# Patient Record
Sex: MALE | Race: OTHER | HISPANIC OR LATINO | URBAN - METROPOLITAN AREA
[De-identification: names, ages, dates, MRNs, and addresses within clinical notes are randomized per-mention and may not be internally consistent; named-entity substitution may affect disease eponyms.]

---

## 2018-04-27 ENCOUNTER — EMERGENCY (EMERGENCY)
Facility: HOSPITAL | Age: 83
LOS: 0 days | Discharge: HOME | End: 2018-04-27
Attending: EMERGENCY MEDICINE | Admitting: EMERGENCY MEDICINE

## 2018-04-27 VITALS
SYSTOLIC BLOOD PRESSURE: 183 MMHG | TEMPERATURE: 97 F | HEART RATE: 74 BPM | RESPIRATION RATE: 18 BRPM | DIASTOLIC BLOOD PRESSURE: 93 MMHG | OXYGEN SATURATION: 99 %

## 2018-04-27 VITALS
RESPIRATION RATE: 18 BRPM | TEMPERATURE: 96 F | OXYGEN SATURATION: 99 % | HEART RATE: 73 BPM | DIASTOLIC BLOOD PRESSURE: 99 MMHG | SYSTOLIC BLOOD PRESSURE: 197 MMHG

## 2018-04-27 DIAGNOSIS — R10.11 RIGHT UPPER QUADRANT PAIN: ICD-10-CM

## 2018-04-27 DIAGNOSIS — R10.13 EPIGASTRIC PAIN: ICD-10-CM

## 2018-04-27 DIAGNOSIS — R10.9 UNSPECIFIED ABDOMINAL PAIN: ICD-10-CM

## 2018-04-27 DIAGNOSIS — Z95.810 PRESENCE OF AUTOMATIC (IMPLANTABLE) CARDIAC DEFIBRILLATOR: Chronic | ICD-10-CM

## 2018-04-27 DIAGNOSIS — Z95.810 PRESENCE OF AUTOMATIC (IMPLANTABLE) CARDIAC DEFIBRILLATOR: ICD-10-CM

## 2018-04-27 DIAGNOSIS — M54.5 LOW BACK PAIN: ICD-10-CM

## 2018-04-27 DIAGNOSIS — J45.909 UNSPECIFIED ASTHMA, UNCOMPLICATED: ICD-10-CM

## 2018-04-27 LAB
ALBUMIN SERPL ELPH-MCNC: 3.9 G/DL — SIGNIFICANT CHANGE UP (ref 3.5–5.2)
ALP SERPL-CCNC: 97 U/L — SIGNIFICANT CHANGE UP (ref 30–115)
ALT FLD-CCNC: 15 U/L — SIGNIFICANT CHANGE UP (ref 0–41)
ANION GAP SERPL CALC-SCNC: 9 MMOL/L — SIGNIFICANT CHANGE UP (ref 7–14)
APPEARANCE UR: CLEAR — SIGNIFICANT CHANGE UP
AST SERPL-CCNC: 16 U/L — SIGNIFICANT CHANGE UP (ref 0–41)
BASOPHILS # BLD AUTO: 0.03 K/UL — SIGNIFICANT CHANGE UP (ref 0–0.2)
BASOPHILS NFR BLD AUTO: 0.6 % — SIGNIFICANT CHANGE UP (ref 0–1)
BILIRUB DIRECT SERPL-MCNC: <0.2 MG/DL — SIGNIFICANT CHANGE UP (ref 0–0.2)
BILIRUB INDIRECT FLD-MCNC: >0.2 MG/DL — SIGNIFICANT CHANGE UP (ref 0.2–1.2)
BILIRUB SERPL-MCNC: 0.4 MG/DL — SIGNIFICANT CHANGE UP (ref 0.2–1.2)
BILIRUB UR-MCNC: NEGATIVE — SIGNIFICANT CHANGE UP
BUN SERPL-MCNC: 13 MG/DL — SIGNIFICANT CHANGE UP (ref 10–20)
CALCIUM SERPL-MCNC: 8.7 MG/DL — SIGNIFICANT CHANGE UP (ref 8.5–10.1)
CHLORIDE SERPL-SCNC: 103 MMOL/L — SIGNIFICANT CHANGE UP (ref 98–110)
CK MB CFR SERPL CALC: 1.8 NG/ML — SIGNIFICANT CHANGE UP (ref 0.6–6.3)
CK SERPL-CCNC: 92 U/L — SIGNIFICANT CHANGE UP (ref 0–225)
CO2 SERPL-SCNC: 29 MMOL/L — SIGNIFICANT CHANGE UP (ref 17–32)
COLOR SPEC: YELLOW — SIGNIFICANT CHANGE UP
CREAT SERPL-MCNC: 1 MG/DL — SIGNIFICANT CHANGE UP (ref 0.7–1.5)
DIFF PNL FLD: NEGATIVE — SIGNIFICANT CHANGE UP
EOSINOPHIL # BLD AUTO: 0.16 K/UL — SIGNIFICANT CHANGE UP (ref 0–0.7)
EOSINOPHIL NFR BLD AUTO: 3.2 % — SIGNIFICANT CHANGE UP (ref 0–8)
GLUCOSE SERPL-MCNC: 118 MG/DL — HIGH (ref 70–99)
GLUCOSE UR QL: NEGATIVE MG/DL — SIGNIFICANT CHANGE UP
HCT VFR BLD CALC: 42.2 % — SIGNIFICANT CHANGE UP (ref 42–52)
HGB BLD-MCNC: 14 G/DL — SIGNIFICANT CHANGE UP (ref 14–18)
IMM GRANULOCYTES NFR BLD AUTO: 0.2 % — SIGNIFICANT CHANGE UP (ref 0.1–0.3)
KETONES UR-MCNC: NEGATIVE — SIGNIFICANT CHANGE UP
LACTATE SERPL-SCNC: 0.9 MMOL/L — SIGNIFICANT CHANGE UP (ref 0.5–2.2)
LEUKOCYTE ESTERASE UR-ACNC: NEGATIVE — SIGNIFICANT CHANGE UP
LIDOCAIN IGE QN: 28 U/L — SIGNIFICANT CHANGE UP (ref 7–60)
LYMPHOCYTES # BLD AUTO: 1.79 K/UL — SIGNIFICANT CHANGE UP (ref 1.2–3.4)
LYMPHOCYTES # BLD AUTO: 35.4 % — SIGNIFICANT CHANGE UP (ref 20.5–51.1)
MCHC RBC-ENTMCNC: 30.9 PG — SIGNIFICANT CHANGE UP (ref 27–31)
MCHC RBC-ENTMCNC: 33.2 G/DL — SIGNIFICANT CHANGE UP (ref 32–37)
MCV RBC AUTO: 93.2 FL — SIGNIFICANT CHANGE UP (ref 80–94)
MONOCYTES # BLD AUTO: 0.55 K/UL — SIGNIFICANT CHANGE UP (ref 0.1–0.6)
MONOCYTES NFR BLD AUTO: 10.9 % — HIGH (ref 1.7–9.3)
NEUTROPHILS # BLD AUTO: 2.52 K/UL — SIGNIFICANT CHANGE UP (ref 1.4–6.5)
NEUTROPHILS NFR BLD AUTO: 49.7 % — SIGNIFICANT CHANGE UP (ref 42.2–75.2)
NITRITE UR-MCNC: NEGATIVE — SIGNIFICANT CHANGE UP
PH UR: 6.5 — SIGNIFICANT CHANGE UP (ref 5–8)
PLATELET # BLD AUTO: 193 K/UL — SIGNIFICANT CHANGE UP (ref 130–400)
POTASSIUM SERPL-MCNC: 3.9 MMOL/L — SIGNIFICANT CHANGE UP (ref 3.5–5)
POTASSIUM SERPL-SCNC: 3.9 MMOL/L — SIGNIFICANT CHANGE UP (ref 3.5–5)
PROT SERPL-MCNC: 6.3 G/DL — SIGNIFICANT CHANGE UP (ref 6–8)
PROT UR-MCNC: NEGATIVE MG/DL — SIGNIFICANT CHANGE UP
RBC # BLD: 4.53 M/UL — LOW (ref 4.7–6.1)
RBC # FLD: 13.2 % — SIGNIFICANT CHANGE UP (ref 11.5–14.5)
SODIUM SERPL-SCNC: 141 MMOL/L — SIGNIFICANT CHANGE UP (ref 135–146)
SP GR SPEC: 1.01 — SIGNIFICANT CHANGE UP (ref 1.01–1.03)
TROPONIN T SERPL-MCNC: <0.01 NG/ML — SIGNIFICANT CHANGE UP
UROBILINOGEN FLD QL: 0.2 MG/DL — SIGNIFICANT CHANGE UP (ref 0.2–0.2)
WBC # BLD: 5.06 K/UL — SIGNIFICANT CHANGE UP (ref 4.8–10.8)
WBC # FLD AUTO: 5.06 K/UL — SIGNIFICANT CHANGE UP (ref 4.8–10.8)

## 2018-04-27 RX ORDER — SODIUM CHLORIDE 9 MG/ML
1000 INJECTION, SOLUTION INTRAVENOUS ONCE
Qty: 0 | Refills: 0 | Status: DISCONTINUED | OUTPATIENT
Start: 2018-04-27 | End: 2018-04-27

## 2018-04-27 RX ORDER — SODIUM CHLORIDE 9 MG/ML
1000 INJECTION, SOLUTION INTRAVENOUS
Qty: 0 | Refills: 0 | Status: DISCONTINUED | OUTPATIENT
Start: 2018-04-27 | End: 2018-04-27

## 2018-04-27 RX ADMIN — SODIUM CHLORIDE 125 MILLILITER(S): 9 INJECTION, SOLUTION INTRAVENOUS at 09:08

## 2018-04-27 NOTE — ED PROVIDER NOTE - PHYSICAL EXAMINATION
CONSTITUTIONAL: Well-developed; well-nourished; in no acute distress.   SKIN: warm, dry  HEAD: Normocephalic; atraumatic.  EYES: no conj injection  ENT: No nasal discharge; airway clear.  NECK: Supple; non tender.  CARD: S1, S2 normal; no murmurs, gallops, or rubs. Regular rate and rhythm.   RESP: No wheezes, rales or rhonchi.  ABD: soft ntnd, no cva tenderness appreciated  BACK: points to right lower paraspinal back as site of pain, some reproducible with deep palpation  EXT: Normal ROM.  No clubbing, cyanosis or edema.   LYMPH: No acute cervical adenopathy.  NEURO: Alert, oriented, grossly unremarkable  PSYCH: Cooperative, appropriate.

## 2018-04-27 NOTE — ED PROVIDER NOTE - ATTENDING CONTRIBUTION TO CARE
I personally evaluated the patient. I reviewed the Resident’s or Physician Assistant’s note (as assigned above), and agree with the findings and plan except as documented in my note.  82y male poor historian h/o kidney stones with right low back/iliac pain, no n/v/d, no urinary symptoms, somewhat worse with movement, no trauma, also with occasional epigastric discomfort, on exam vital signs appreciated, well appearing, conj pink neck supple cor reg lungs cta abd +bs, soft with minimal epi/ruq ttp no g/r neg Saucedo's, +right PSIS and lower lumbar ttp reproducing pain, also with mild rt cvat, pulses equal no c/c/e neuro intact, will check labs, urine, imaging

## 2018-04-27 NOTE — ED PROVIDER NOTE - OBJECTIVE STATEMENT
82 y M pmh including defibrillator, kidney stone, cc right sided back/flank pain x 4 days, constant, positional, not radiating anywhere, no leg numbness 82 y M pmh including defibrillator, kidney stone, cc right sided back/flank pain x 4 days, constant, positional, not radiating anywhere, no leg numbness no abd pain nausea or vomiting, no dysuria or hematuria, no syncope. no cp or sob. no pre syncope. says pain feels similar to kidney stone from years ago.

## 2018-04-27 NOTE — ED PROVIDER NOTE - NS ED ATTENDING STATEMENT MOD
I have personally seen and examined this patient.  I have fully participated in the care of this patient. I have reviewed all pertinent clinical information, including history, physical exam, plan and the Resident’s note and agree except as noted. Alert and oriented to person, place and time

## 2018-04-27 NOTE — ED PROVIDER NOTE - PROGRESS NOTE DETAILS
bedside sono showed normal size aorta to bifurcation, and no signs of hydronephrosis; patient's location of pain and reproducible nature suggested muscular pain; patient's labs and imaging unremarkable with incidental findings that he was informed he has to followup with his doctor about for further testing. patient given copies of results

## 2018-04-27 NOTE — ED ADULT TRIAGE NOTE - CHIEF COMPLAINT QUOTE
Right flank pain for 3 days. Relieved with Tylenol Right flank pain for over a week. Relieved with Tylenol

## 2018-04-28 LAB
CULTURE RESULTS: NO GROWTH — SIGNIFICANT CHANGE UP
SPECIMEN SOURCE: SIGNIFICANT CHANGE UP

## 2018-10-05 ENCOUNTER — INPATIENT (INPATIENT)
Facility: HOSPITAL | Age: 83
LOS: 3 days | Discharge: HOME | End: 2018-10-09
Attending: INTERNAL MEDICINE | Admitting: INTERNAL MEDICINE

## 2018-10-05 VITALS
SYSTOLIC BLOOD PRESSURE: 175 MMHG | HEIGHT: 68 IN | RESPIRATION RATE: 22 BRPM | WEIGHT: 169.98 LBS | OXYGEN SATURATION: 98 % | HEART RATE: 138 BPM | TEMPERATURE: 102 F | DIASTOLIC BLOOD PRESSURE: 104 MMHG

## 2018-10-05 DIAGNOSIS — Z95.810 PRESENCE OF AUTOMATIC (IMPLANTABLE) CARDIAC DEFIBRILLATOR: Chronic | ICD-10-CM

## 2018-10-05 LAB
ALBUMIN SERPL ELPH-MCNC: 4.1 G/DL — SIGNIFICANT CHANGE UP (ref 3.5–5.2)
ALP SERPL-CCNC: 120 U/L — HIGH (ref 30–115)
ALT FLD-CCNC: 16 U/L — SIGNIFICANT CHANGE UP (ref 0–41)
ANION GAP SERPL CALC-SCNC: 12 MMOL/L — SIGNIFICANT CHANGE UP (ref 7–14)
APPEARANCE UR: CLEAR — SIGNIFICANT CHANGE UP
APTT BLD: 26.6 SEC — LOW (ref 27–39.2)
AST SERPL-CCNC: 19 U/L — SIGNIFICANT CHANGE UP (ref 0–41)
BACTERIA # UR AUTO: ABNORMAL
BASE EXCESS BLDV CALC-SCNC: 4.8 MMOL/L — HIGH (ref -2–2)
BASOPHILS # BLD AUTO: 0.04 K/UL — SIGNIFICANT CHANGE UP (ref 0–0.2)
BASOPHILS NFR BLD AUTO: 0.3 % — SIGNIFICANT CHANGE UP (ref 0–1)
BILIRUB SERPL-MCNC: 0.5 MG/DL — SIGNIFICANT CHANGE UP (ref 0.2–1.2)
BILIRUB UR-MCNC: NEGATIVE — SIGNIFICANT CHANGE UP
BUN SERPL-MCNC: 12 MG/DL — SIGNIFICANT CHANGE UP (ref 10–20)
CA-I SERPL-SCNC: 1.17 MMOL/L — SIGNIFICANT CHANGE UP (ref 1.12–1.3)
CALCIUM SERPL-MCNC: 9.3 MG/DL — SIGNIFICANT CHANGE UP (ref 8.5–10.1)
CHLORIDE SERPL-SCNC: 99 MMOL/L — SIGNIFICANT CHANGE UP (ref 98–110)
CO2 SERPL-SCNC: 29 MMOL/L — SIGNIFICANT CHANGE UP (ref 17–32)
COD CRY URNS QL: NEGATIVE — SIGNIFICANT CHANGE UP
COLOR SPEC: YELLOW — SIGNIFICANT CHANGE UP
CREAT SERPL-MCNC: 1 MG/DL — SIGNIFICANT CHANGE UP (ref 0.7–1.5)
DIFF PNL FLD: NEGATIVE — SIGNIFICANT CHANGE UP
EOSINOPHIL # BLD AUTO: 0.22 K/UL — SIGNIFICANT CHANGE UP (ref 0–0.7)
EOSINOPHIL NFR BLD AUTO: 1.9 % — SIGNIFICANT CHANGE UP (ref 0–8)
EPI CELLS # UR: NEGATIVE — SIGNIFICANT CHANGE UP
GAS PNL BLDV: 141 MMOL/L — SIGNIFICANT CHANGE UP (ref 136–145)
GAS PNL BLDV: SIGNIFICANT CHANGE UP
GLUCOSE SERPL-MCNC: 136 MG/DL — HIGH (ref 70–99)
GLUCOSE UR QL: NEGATIVE MG/DL — SIGNIFICANT CHANGE UP
GRAN CASTS # UR COMP ASSIST: NEGATIVE — SIGNIFICANT CHANGE UP
HCO3 BLDV-SCNC: 32 MMOL/L — HIGH (ref 22–29)
HCT VFR BLD CALC: 46.8 % — SIGNIFICANT CHANGE UP (ref 42–52)
HCT VFR BLDA CALC: 48 % — HIGH (ref 34–44)
HGB BLD CALC-MCNC: 15.7 G/DL — SIGNIFICANT CHANGE UP (ref 14–18)
HGB BLD-MCNC: 15.6 G/DL — SIGNIFICANT CHANGE UP (ref 14–18)
HOROWITZ INDEX BLDV+IHG-RTO: 40 — SIGNIFICANT CHANGE UP
HYALINE CASTS # UR AUTO: NEGATIVE — SIGNIFICANT CHANGE UP
IMM GRANULOCYTES NFR BLD AUTO: 0.3 % — SIGNIFICANT CHANGE UP (ref 0.1–0.3)
INR BLD: 1.02 RATIO — SIGNIFICANT CHANGE UP (ref 0.65–1.3)
KETONES UR-MCNC: ABNORMAL
LACTATE BLDV-MCNC: 0.9 MMOL/L — SIGNIFICANT CHANGE UP (ref 0.5–1.6)
LEUKOCYTE ESTERASE UR-ACNC: NEGATIVE — SIGNIFICANT CHANGE UP
LYMPHOCYTES # BLD AUTO: 1.51 K/UL — SIGNIFICANT CHANGE UP (ref 1.2–3.4)
LYMPHOCYTES # BLD AUTO: 12.8 % — LOW (ref 20.5–51.1)
MAGNESIUM SERPL-MCNC: 1.8 MG/DL — SIGNIFICANT CHANGE UP (ref 1.8–2.4)
MCHC RBC-ENTMCNC: 30.8 PG — SIGNIFICANT CHANGE UP (ref 27–31)
MCHC RBC-ENTMCNC: 33.3 G/DL — SIGNIFICANT CHANGE UP (ref 32–37)
MCV RBC AUTO: 92.3 FL — SIGNIFICANT CHANGE UP (ref 80–94)
MONOCYTES # BLD AUTO: 1.2 K/UL — HIGH (ref 0.1–0.6)
MONOCYTES NFR BLD AUTO: 10.2 % — HIGH (ref 1.7–9.3)
NEUTROPHILS # BLD AUTO: 8.76 K/UL — HIGH (ref 1.4–6.5)
NEUTROPHILS NFR BLD AUTO: 74.5 % — SIGNIFICANT CHANGE UP (ref 42.2–75.2)
NITRITE UR-MCNC: NEGATIVE — SIGNIFICANT CHANGE UP
NRBC # BLD: 0 /100 WBCS — SIGNIFICANT CHANGE UP (ref 0–0)
PCO2 BLDV: 56 MMHG — HIGH (ref 41–51)
PH BLDV: 7.36 — SIGNIFICANT CHANGE UP (ref 7.26–7.43)
PH UR: 7 — SIGNIFICANT CHANGE UP (ref 5–8)
PLATELET # BLD AUTO: 210 K/UL — SIGNIFICANT CHANGE UP (ref 130–400)
PO2 BLDV: 19 MMHG — LOW (ref 20–40)
POTASSIUM BLDV-SCNC: 4.2 MMOL/L — SIGNIFICANT CHANGE UP (ref 3.3–5.6)
POTASSIUM SERPL-MCNC: 4.4 MMOL/L — SIGNIFICANT CHANGE UP (ref 3.5–5)
POTASSIUM SERPL-SCNC: 4.4 MMOL/L — SIGNIFICANT CHANGE UP (ref 3.5–5)
PROT SERPL-MCNC: 7.5 G/DL — SIGNIFICANT CHANGE UP (ref 6–8)
PROT UR-MCNC: 30 MG/DL
PROTHROM AB SERPL-ACNC: 11 SEC — SIGNIFICANT CHANGE UP (ref 9.95–12.87)
RBC # BLD: 5.07 M/UL — SIGNIFICANT CHANGE UP (ref 4.7–6.1)
RBC # FLD: 13 % — SIGNIFICANT CHANGE UP (ref 11.5–14.5)
RBC CASTS # UR COMP ASSIST: SIGNIFICANT CHANGE UP /HPF
SAO2 % BLDV: 30 % — SIGNIFICANT CHANGE UP
SODIUM SERPL-SCNC: 140 MMOL/L — SIGNIFICANT CHANGE UP (ref 135–146)
SP GR SPEC: 1.02 — SIGNIFICANT CHANGE UP (ref 1.01–1.03)
TRI-PHOS CRY UR QL COMP ASSIST: NEGATIVE — SIGNIFICANT CHANGE UP
TROPONIN T SERPL-MCNC: <0.01 NG/ML — SIGNIFICANT CHANGE UP
URATE CRY FLD QL MICRO: NEGATIVE — SIGNIFICANT CHANGE UP
UROBILINOGEN FLD QL: 1 MG/DL (ref 0.2–0.2)
WBC # BLD: 11.77 K/UL — HIGH (ref 4.8–10.8)
WBC # FLD AUTO: 11.77 K/UL — HIGH (ref 4.8–10.8)
WBC UR QL: SIGNIFICANT CHANGE UP /HPF

## 2018-10-05 RX ORDER — ACETAMINOPHEN 500 MG
975 TABLET ORAL ONCE
Qty: 0 | Refills: 0 | Status: DISCONTINUED | OUTPATIENT
Start: 2018-10-05 | End: 2018-10-05

## 2018-10-05 RX ORDER — ACETAMINOPHEN 500 MG
650 TABLET ORAL ONCE
Qty: 0 | Refills: 0 | Status: COMPLETED | OUTPATIENT
Start: 2018-10-05 | End: 2018-10-05

## 2018-10-05 RX ORDER — MEROPENEM 1 G/30ML
1000 INJECTION INTRAVENOUS ONCE
Qty: 0 | Refills: 0 | Status: COMPLETED | OUTPATIENT
Start: 2018-10-05 | End: 2018-10-05

## 2018-10-05 RX ORDER — MONTELUKAST 4 MG/1
0 TABLET, CHEWABLE ORAL
Qty: 0 | Refills: 0 | COMMUNITY

## 2018-10-05 RX ORDER — ASPIRIN/CALCIUM CARB/MAGNESIUM 324 MG
325 TABLET ORAL ONCE
Qty: 0 | Refills: 0 | Status: DISCONTINUED | OUTPATIENT
Start: 2018-10-05 | End: 2018-10-05

## 2018-10-05 RX ORDER — SODIUM CHLORIDE 9 MG/ML
2400 INJECTION, SOLUTION INTRAVENOUS ONCE
Qty: 0 | Refills: 0 | Status: COMPLETED | OUTPATIENT
Start: 2018-10-05 | End: 2018-10-05

## 2018-10-05 RX ORDER — ASPIRIN/CALCIUM CARB/MAGNESIUM 324 MG
325 TABLET ORAL ONCE
Qty: 0 | Refills: 0 | Status: COMPLETED | OUTPATIENT
Start: 2018-10-05 | End: 2018-10-05

## 2018-10-05 RX ORDER — IPRATROPIUM/ALBUTEROL SULFATE 18-103MCG
3 AEROSOL WITH ADAPTER (GRAM) INHALATION ONCE
Qty: 0 | Refills: 0 | Status: COMPLETED | OUTPATIENT
Start: 2018-10-05 | End: 2018-10-05

## 2018-10-05 RX ADMIN — MEROPENEM 1000 MILLIGRAM(S): 1 INJECTION INTRAVENOUS at 20:26

## 2018-10-05 RX ADMIN — Medication 125 MILLIGRAM(S): at 18:51

## 2018-10-05 RX ADMIN — Medication 650 MILLIGRAM(S): at 21:22

## 2018-10-05 RX ADMIN — SODIUM CHLORIDE 2400 MILLILITER(S): 9 INJECTION, SOLUTION INTRAVENOUS at 21:23

## 2018-10-05 RX ADMIN — SODIUM CHLORIDE 2400 MILLILITER(S): 9 INJECTION, SOLUTION INTRAVENOUS at 19:14

## 2018-10-05 RX ADMIN — Medication 650 MILLIGRAM(S): at 19:30

## 2018-10-05 RX ADMIN — Medication 3 MILLILITER(S): at 18:40

## 2018-10-05 NOTE — ED PROVIDER NOTE - OBJECTIVE STATEMENT
81 yo M with pmhx of asthma, COPD, ICD bibems presenting with sudden onset of shortness of breath associated with fever. Patient was hypoxic placed on CPAP given combovents and nitroglycerin. No cp, abdominal pain, nausea, vomiting, diarrhea, back pain, urinary symptoms, headache, dizziness, paresthesias, or weakness.

## 2018-10-05 NOTE — ED PROVIDER NOTE - SEVERE SEPSIS ALERT DETAILS
Sepsis suspected at this time.   Appropriate fluids ordered and will give Meropenem for broad spectrum coverage

## 2018-10-05 NOTE — ED PROVIDER NOTE - PHYSICAL EXAMINATION
VITAL SIGNS: I have reviewed nursing notes and confirm.  CONSTITUTIONAL: Lethargic, on CPAP in respiratory distress.   SKIN: Skin exam is warm and dry, no acute rash.  HEAD: Normocephalic; atraumatic.  EYES: PERRL, EOM intact; conjunctiva and sclera clear.  ENT: No nasal discharge; airway clear.   NECK: Supple; non tender.  CARD: S1, S2 normal; no murmurs, gallops, or rubs. Regular rate and rhythm.  RESP: Clear to auscultation bilaterally. No wheezes, rales or rhonchi.  ABD: Normal bowel sounds; soft; non-distended; non-tender.   EXT: Normal ROM.  NEURO: Alert, oriented. Grossly unremarkable. No focal deficits.

## 2018-10-05 NOTE — ED PROVIDER NOTE - CARE PLAN
Principal Discharge DX:	Sepsis, due to unspecified organism  Secondary Diagnosis:	Respiratory distress  Secondary Diagnosis:	Abnormal EKG

## 2018-10-05 NOTE — ED PROVIDER NOTE - NS ED ROS FT
Review of Systems:  	•	CONSTITUTIONAL - +fever, no diaphoresis  	•	SKIN - no rash  	•	HEMATOLOGIC - no bleeding, no bruising  	•	EYES - no eye pain, no blurry vision  	•	ENT - no congestion  	•	RESPIRATORY - +shortness of breath, no cough  	•	CARDIAC - no chest pain, no palpitations  	•	GI - no abd pain, no nausea, no vomiting, no diarrhea, no constipation  	•	GENITO-URINARY - no dysuria; no hematuria, no increased urinary frequency  	•	MUSCULOSKELETAL - no joint paint, no swelling, no redness  	•	NEUROLOGIC - no weakness, no headache, no paresthesias, no LOC  	All other ROS are negative except as documented in HPI.

## 2018-10-05 NOTE — SEPSIS NOTE - ASSESSMENT
Pt. BIBA with BIPAP machine in place appears to be in slight respiratory distress, A,Ox3,calm and responsive to verbal stimuli. Pt. came in with IV on the left AC.

## 2018-10-05 NOTE — ED PROVIDER NOTE - MEDICAL DECISION MAKING DETAILS
83 yo M PMH asthma, COPD, Cardiac Disease with AICD BIBEMS as a notification for acute respiratory distress. ED cleared and Respiratory called pending arrival.  Patient was hypoxic placed on CPAP given combivents and nitroglycerin by EMS. Patient initially very hypertensive and also had extreme wide complex tachycardia ('s).  Patient had elevated blood pressure and 's.  Pads available and airway cart at bedside, patient placed immediately on BIPAP.  Patient has a bundle branch block and had intermittent paced rhythm.  Concern for a few beats of V Tach that converted with his PPM.  Primary survey intact.  Patient improving with BIPAP and found to have fever to ~ 103 F.  Concern for sepsis and fluids given (lungs had mild expiratory wheezing on presentation with tachypnea) because lungs sounded dry. Appropriate sepsis labs sent. Family arrived in ED and patient had fever today. ICU consulted and patient had labs, CXR, urine and a CT chest for PE/pneumonia evaluation and was negative.    HR improved significantly with resuscitation and patient reassessed multiple times.  Patient looks significantly better than arrival.  Patient to be admitted to ICU.

## 2018-10-06 DIAGNOSIS — Z96.641 PRESENCE OF RIGHT ARTIFICIAL HIP JOINT: Chronic | ICD-10-CM

## 2018-10-06 DIAGNOSIS — Z90.49 ACQUIRED ABSENCE OF OTHER SPECIFIED PARTS OF DIGESTIVE TRACT: Chronic | ICD-10-CM

## 2018-10-06 PROBLEM — J45.909 UNSPECIFIED ASTHMA, UNCOMPLICATED: Chronic | Status: ACTIVE | Noted: 2018-04-27

## 2018-10-06 PROBLEM — N20.0 CALCULUS OF KIDNEY: Chronic | Status: ACTIVE | Noted: 2018-04-27

## 2018-10-06 PROBLEM — N42.9 DISORDER OF PROSTATE, UNSPECIFIED: Chronic | Status: ACTIVE | Noted: 2018-04-27

## 2018-10-06 LAB
ANION GAP SERPL CALC-SCNC: 13 MMOL/L — SIGNIFICANT CHANGE UP (ref 7–14)
BASOPHILS # BLD AUTO: 0.01 K/UL — SIGNIFICANT CHANGE UP (ref 0–0.2)
BASOPHILS NFR BLD AUTO: 0.1 % — SIGNIFICANT CHANGE UP (ref 0–1)
BUN SERPL-MCNC: 14 MG/DL — SIGNIFICANT CHANGE UP (ref 10–20)
CALCIUM SERPL-MCNC: 8.6 MG/DL — SIGNIFICANT CHANGE UP (ref 8.5–10.1)
CHLORIDE SERPL-SCNC: 101 MMOL/L — SIGNIFICANT CHANGE UP (ref 98–110)
CO2 SERPL-SCNC: 27 MMOL/L — SIGNIFICANT CHANGE UP (ref 17–32)
CREAT SERPL-MCNC: 0.9 MG/DL — SIGNIFICANT CHANGE UP (ref 0.7–1.5)
CULTURE RESULTS: NO GROWTH — SIGNIFICANT CHANGE UP
EOSINOPHIL # BLD AUTO: 0 K/UL — SIGNIFICANT CHANGE UP (ref 0–0.7)
EOSINOPHIL NFR BLD AUTO: 0 % — SIGNIFICANT CHANGE UP (ref 0–8)
GLUCOSE SERPL-MCNC: 169 MG/DL — HIGH (ref 70–99)
HCT VFR BLD CALC: 41.7 % — LOW (ref 42–52)
HGB BLD-MCNC: 13.8 G/DL — LOW (ref 14–18)
IMM GRANULOCYTES NFR BLD AUTO: 0.3 % — SIGNIFICANT CHANGE UP (ref 0.1–0.3)
LYMPHOCYTES # BLD AUTO: 0.88 K/UL — LOW (ref 1.2–3.4)
LYMPHOCYTES # BLD AUTO: 9.3 % — LOW (ref 20.5–51.1)
MCHC RBC-ENTMCNC: 30.3 PG — SIGNIFICANT CHANGE UP (ref 27–31)
MCHC RBC-ENTMCNC: 33.1 G/DL — SIGNIFICANT CHANGE UP (ref 32–37)
MCV RBC AUTO: 91.6 FL — SIGNIFICANT CHANGE UP (ref 80–94)
MONOCYTES # BLD AUTO: 0.12 K/UL — SIGNIFICANT CHANGE UP (ref 0.1–0.6)
MONOCYTES NFR BLD AUTO: 1.3 % — LOW (ref 1.7–9.3)
NEUTROPHILS # BLD AUTO: 8.44 K/UL — HIGH (ref 1.4–6.5)
NEUTROPHILS NFR BLD AUTO: 89 % — HIGH (ref 42.2–75.2)
NRBC # BLD: 0 /100 WBCS — SIGNIFICANT CHANGE UP (ref 0–0)
PLATELET # BLD AUTO: 189 K/UL — SIGNIFICANT CHANGE UP (ref 130–400)
POTASSIUM SERPL-MCNC: 4 MMOL/L — SIGNIFICANT CHANGE UP (ref 3.5–5)
POTASSIUM SERPL-SCNC: 4 MMOL/L — SIGNIFICANT CHANGE UP (ref 3.5–5)
RBC # BLD: 4.55 M/UL — LOW (ref 4.7–6.1)
RBC # FLD: 12.9 % — SIGNIFICANT CHANGE UP (ref 11.5–14.5)
SODIUM SERPL-SCNC: 141 MMOL/L — SIGNIFICANT CHANGE UP (ref 135–146)
SPECIMEN SOURCE: SIGNIFICANT CHANGE UP
WBC # BLD: 9.48 K/UL — SIGNIFICANT CHANGE UP (ref 4.8–10.8)
WBC # FLD AUTO: 9.48 K/UL — SIGNIFICANT CHANGE UP (ref 4.8–10.8)

## 2018-10-06 RX ORDER — MONTELUKAST 4 MG/1
10 TABLET, CHEWABLE ORAL DAILY
Qty: 0 | Refills: 0 | Status: DISCONTINUED | OUTPATIENT
Start: 2018-10-06 | End: 2018-10-09

## 2018-10-06 RX ORDER — DORZOLAMIDE HYDROCHLORIDE 20 MG/ML
1 SOLUTION/ DROPS OPHTHALMIC THREE TIMES A DAY
Qty: 0 | Refills: 0 | Status: DISCONTINUED | OUTPATIENT
Start: 2018-10-06 | End: 2018-10-09

## 2018-10-06 RX ORDER — CARVEDILOL PHOSPHATE 80 MG/1
12.5 CAPSULE, EXTENDED RELEASE ORAL EVERY 12 HOURS
Qty: 0 | Refills: 0 | Status: DISCONTINUED | OUTPATIENT
Start: 2018-10-06 | End: 2018-10-09

## 2018-10-06 RX ORDER — LATANOPROST 0.05 MG/ML
1 SOLUTION/ DROPS OPHTHALMIC; TOPICAL AT BEDTIME
Qty: 0 | Refills: 0 | Status: DISCONTINUED | OUTPATIENT
Start: 2018-10-06 | End: 2018-10-07

## 2018-10-06 RX ORDER — ENOXAPARIN SODIUM 100 MG/ML
40 INJECTION SUBCUTANEOUS EVERY 24 HOURS
Qty: 0 | Refills: 0 | Status: DISCONTINUED | OUTPATIENT
Start: 2018-10-06 | End: 2018-10-09

## 2018-10-06 RX ORDER — BRIMONIDINE TARTRATE 2 MG/MG
1 SOLUTION/ DROPS OPHTHALMIC THREE TIMES A DAY
Qty: 0 | Refills: 0 | Status: DISCONTINUED | OUTPATIENT
Start: 2018-10-06 | End: 2018-10-09

## 2018-10-06 RX ORDER — INFLUENZA VIRUS VACCINE 15; 15; 15; 15 UG/.5ML; UG/.5ML; UG/.5ML; UG/.5ML
0.5 SUSPENSION INTRAMUSCULAR ONCE
Qty: 0 | Refills: 0 | Status: COMPLETED | OUTPATIENT
Start: 2018-10-06 | End: 2018-10-09

## 2018-10-06 RX ORDER — ATORVASTATIN CALCIUM 80 MG/1
40 TABLET, FILM COATED ORAL AT BEDTIME
Qty: 0 | Refills: 0 | Status: DISCONTINUED | OUTPATIENT
Start: 2018-10-06 | End: 2018-10-09

## 2018-10-06 RX ORDER — LOSARTAN POTASSIUM 100 MG/1
50 TABLET, FILM COATED ORAL DAILY
Qty: 0 | Refills: 0 | Status: DISCONTINUED | OUTPATIENT
Start: 2018-10-06 | End: 2018-10-09

## 2018-10-06 RX ORDER — IPRATROPIUM/ALBUTEROL SULFATE 18-103MCG
3 AEROSOL WITH ADAPTER (GRAM) INHALATION EVERY 6 HOURS
Qty: 0 | Refills: 0 | Status: DISCONTINUED | OUTPATIENT
Start: 2018-10-06 | End: 2018-10-08

## 2018-10-06 RX ORDER — TAMSULOSIN HYDROCHLORIDE 0.4 MG/1
0.4 CAPSULE ORAL AT BEDTIME
Qty: 0 | Refills: 0 | Status: DISCONTINUED | OUTPATIENT
Start: 2018-10-06 | End: 2018-10-09

## 2018-10-06 RX ADMIN — Medication 80 MILLIGRAM(S): at 06:22

## 2018-10-06 RX ADMIN — TAMSULOSIN HYDROCHLORIDE 0.4 MILLIGRAM(S): 0.4 CAPSULE ORAL at 22:52

## 2018-10-06 RX ADMIN — ENOXAPARIN SODIUM 40 MILLIGRAM(S): 100 INJECTION SUBCUTANEOUS at 09:24

## 2018-10-06 RX ADMIN — DORZOLAMIDE HYDROCHLORIDE 1 DROP(S): 20 SOLUTION/ DROPS OPHTHALMIC at 14:54

## 2018-10-06 RX ADMIN — ATORVASTATIN CALCIUM 40 MILLIGRAM(S): 80 TABLET, FILM COATED ORAL at 22:14

## 2018-10-06 RX ADMIN — LOSARTAN POTASSIUM 50 MILLIGRAM(S): 100 TABLET, FILM COATED ORAL at 09:24

## 2018-10-06 RX ADMIN — DORZOLAMIDE HYDROCHLORIDE 1 DROP(S): 20 SOLUTION/ DROPS OPHTHALMIC at 22:15

## 2018-10-06 RX ADMIN — CARVEDILOL PHOSPHATE 12.5 MILLIGRAM(S): 80 CAPSULE, EXTENDED RELEASE ORAL at 22:14

## 2018-10-06 RX ADMIN — Medication 80 MILLIGRAM(S): at 18:36

## 2018-10-06 RX ADMIN — CARVEDILOL PHOSPHATE 12.5 MILLIGRAM(S): 80 CAPSULE, EXTENDED RELEASE ORAL at 09:24

## 2018-10-06 RX ADMIN — LATANOPROST 1 DROP(S): 0.05 SOLUTION/ DROPS OPHTHALMIC; TOPICAL at 22:16

## 2018-10-06 RX ADMIN — BRIMONIDINE TARTRATE 1 DROP(S): 2 SOLUTION/ DROPS OPHTHALMIC at 22:15

## 2018-10-06 RX ADMIN — BRIMONIDINE TARTRATE 1 DROP(S): 2 SOLUTION/ DROPS OPHTHALMIC at 14:54

## 2018-10-06 RX ADMIN — MONTELUKAST 10 MILLIGRAM(S): 4 TABLET, CHEWABLE ORAL at 12:27

## 2018-10-06 NOTE — H&P ADULT - ATTENDING COMMENTS
Patient was evaluated and examined by bedside, independently,  no c/o dyspnea at night, patient tolerated bipap tx well, no cough, mild nasal congestion. no fever.  tolerating diet well, reported occasionally having pain during swallowing  All labs, radiology studies, VS was reviewed  I agree with medical plan outlined by Medical resident as stated above.  -continue oxygen, nebs, solumedrol tx. cxs to follow  -consult GI for occasional odynophagia ? esophageal spasms  -f/up Pulmonary specialist for further recommendations

## 2018-10-06 NOTE — H&P ADULT - NSHPLABSRESULTS_GEN_ALL_CORE
13.8   9.48  )-----------( 189      ( 06 Oct 2018 05:30 )             41.7       10-06    141  |  101  |  14  ----------------------------<  169<H>  4.0   |  27  |  0.9    Ca    8.6      06 Oct 2018 05:36  Mg     1.8     10-05    TPro  7.5  /  Alb  4.1  /  TBili  0.5  /  DBili  x   /  AST  19  /  ALT  16  /  AlkPhos  120<H>  10-05              Urinalysis Basic - ( 05 Oct 2018 21:00 )    Color: Yellow / Appearance: Clear / S.020 / pH: x  Gluc: x / Ketone: Trace  / Bili: Negative / Urobili: 1.0 mg/dL   Blood: x / Protein: 30 mg/dL / Nitrite: Negative   Leuk Esterase: Negative / RBC: 1-2 /HPF / WBC 1-2 /HPF   Sq Epi: x / Non Sq Epi: Negative / Bacteria: Few        PT/INR - ( 05 Oct 2018 18:45 )   PT: 11.00 sec;   INR: 1.02 ratio         PTT - ( 05 Oct 2018 18:45 )  PTT:26.6 sec    Lactate Trend      CARDIAC MARKERS ( 05 Oct 2018 18:45 )  x     / <0.01 ng/mL / x     / x     / x            CAPILLARY BLOOD GLUCOSE

## 2018-10-06 NOTE — CONSULT NOTE ADULT - SUBJECTIVE AND OBJECTIVE BOX
Date of Admission: 10/6/2018    CHIEF COMPLAINT: SOB    HISTORY OF PRESENT ILLNESS: 82yMale with PMH below presented to the hospital for SOB that started earlier today and was associated with fever, chills, and a mildly productive cough. He denies other associated symptoms. Denies chest destiney denies dizziness. Has a history of CHF, COPD and has a biV- AICD in place. His cardiologist is in [place]. He denies simiilar symptoms in the past. No recent hospitalizations. no recent travel.     PAST MEDICAL & SURGICAL HISTORY:  COPD (chronic obstructive pulmonary disease)  Prostate disorder  Kidney stones  Asthma  History of implantable cardioverter-defibrillator (ICD) placement    HEALTH ISSUES - PROBLEM Dx:        FAMILY HISTORY:    None [ ]  Mother:   Father:   Siblings:     SOCIAL HISTORY:    [ x] Non-smoker: former  [ ] Smoker  [ ] Alcohol    Allergies    No Known Allergies    Intolerances    	    REVIEW OF SYSTEMS:  CONSTITUTIONAL: No fever, weight loss, or fatigue  CARDIOLOGY: see HPI  RESPIRATORY: see HPI  NEUROLOGICAL: NO weakness, no focal deficits to report.  ENDOCRINOLOGICAL: no recent change in diabetic medications.   GI: no BRBPR, no N,V,diarrhea.    PSYCHIATRY: normal mood and affect  HEENT: no nasal discharge, no ecchymosis  SKIN: no ecchymosis, no breakdown  MUSCULOSKELETAL: Full range of motion x4.      PHYSICAL EXAM:  T(C): 38.3 (10-05-18 @ 21:21), Max: 39.9 (10-05-18 @ 20:11)  HR: 60 (10-05-18 @ 23:05) (60 - 138)  BP: 155/70 (10-05-18 @ 23:05) (143/82 - 175/104)  RR: 20 (10-05-18 @ 23:05) (20 - 24)  SpO2: 100% (10-05-18 @ 23:05) (98% - 100%)  Wt(kg): --  I&O's Summary    Daily Height in cm: 172.72 (05 Oct 2018 18:43)    Daily     General Appearance: Normal for age and gender  Cardiovascular: rapid but regular S1 S2, No JVD, No murmurs, No edema  Respiratory: +wheeze bilaterally. on bipap  Psychiatry: A & O x 3, Mood & affect appropriate  Gastrointestinal:  Soft, Non-tender  Skin: No rashes, No ecchymoses, No cyanosis	  Neurologic: Non-focal  Musculoskeletal/ extremities: Normal range of motion, No clubbing, cyanosis or edema  Vascular: Peripheral pulses palpable 2+ bilaterally    LABS:	 	                          15.6   11.77 )-----------( 210      ( 05 Oct 2018 18:45 )             46.8     10-05    140  |  99  |  12  ----------------------------<  136<H>  4.4   |  29  |  1.0    Ca    9.3      05 Oct 2018 18:45  Mg     1.8     10-05    TPro  7.5  /  Alb  4.1  /  TBili  0.5  /  DBili  x   /  AST  19  /  ALT  16  /  AlkPhos  120<H>  10-05    CARDIAC MARKERS ( 05 Oct 2018 18:45 )  x     / <0.01 ng/mL / x     / x     / x          PT/INR - ( 05 Oct 2018 18:45 )   PT: 11.00 sec;   INR: 1.02 ratio         PTT - ( 05 Oct 2018 18:45 )  PTT:26.6 sec    CARDIAC MARKERS:            TELEMETRY EVENTS: sinus tach with IVCD and frequent PVCs and paced complexes	    ECG:  	sinus tach with IVCD and some PVCs and paced complexes  RADIOLOGY: < from: CT Chest w/ IV Cont (10.06.18 @ 00:37) >  IMPRESSION:  1. No evidence of acute pulmonary embolism.  2. Right upper lobe 0.4 cm pulmonary nodule. Per Fleischner Society 2017   guidelines, no further recommendations provided in a low risk patient,   and consider optional CT chest in 12 months in a high risk patient   (history of smoking).    < end of copied text >    OTHER: 	    PREVIOUS DIAGNOSTIC TESTING:    [x ] Echocardiogram: P  [ ]  Catheterization:  [ ] Stress Test:  	  	    Home Medications:  atorvastatin:  (05 Oct 2018 18:46)  carvedilol:  (05 Oct 2018 18:46)  losartan:  (05 Oct 2018 18:46)  montelukast:  (05 Oct 2018 18:46)    MEDICATIONS  (STANDING):    MEDICATIONS  (PRN): CHIEF COMPLAINT: SOB    HISTORY OF PRESENT ILLNESS: 82 y Male with PMH below presented to the hospital for SOB that started earlier today and was associated with fever, chills, and a mildly productive cough. He denies other associated symptoms. Denies chest destiney denies dizziness. Has a history of CHF, COPD and has a biV- AICD in place. His cardiologist is in [place]. He denies simiilar symptoms in the past. No recent hospitalizations. no recent travel. was called as notification for increase HR, in ED WAS PLACED ON BIPAP, HAD CT ANGIO NO PE, CALLED TO EVALUATE.    PAST MEDICAL & SURGICAL HISTORY:  COPD (chronic obstructive pulmonary disease)  Prostate disorder  Kidney stones  Asthma  History of implantable cardioverter-defibrillator (ICD) placement    FAMILY HISTORY:    SOCIAL HISTORY:    [ x] Non-smoker: former      Allergies    No Known Allergies    Intolerances    	    REVIEW OF SYSTEMS:  CONSTITUTIONAL: No fever, weight loss, or fatigue  CARDIOLOGY: see HPI  RESPIRATORY: see HPI  NEUROLOGICAL: NO weakness, no focal deficits to report.  ENDOCRINOLOGICAL: no recent change in diabetic medications.   GI: no BRBPR, no N,V,diarrhea.    PSYCHIATRY: normal mood and affect  HEENT: no nasal discharge, no ecchymosis  SKIN: no ecchymosis, no breakdown  MUSCULOSKELETAL: Full range of motion x4.      PHYSICAL EXAM:  T(C): 38.3 (10-05-18 @ 21:21), Max: 39.9 (10-05-18 @ 20:11)  HR: 60 (10-05-18 @ 23:05) (60 - 138)  BP: 155/70 (10-05-18 @ 23:05) (143/82 - 175/104)  RR: 20 (10-05-18 @ 23:05) (20 - 24)  SpO2: 100% (10-05-18 @ 23:05) (98% - 100%)  I&O's Summary    Daily Height in cm: 172.72 (05 Oct 2018 18:43)    Daily     General Appearance: Normal for age and gender  Cardiovascular: rapid but regular S1 S2, No JVD, No murmurs, No edema  Respiratory: +wheeze bilaterally. on bipap  Psychiatry: A & O x 3, Mood & affect appropriate  Gastrointestinal:  Soft, Non-tender  Skin: No rashes, No ecchymoses, No cyanosis	  Neurologic: Non-focal  Musculoskeletal/ extremities: Normal range of motion, No clubbing, cyanosis or edema  Vascular: Peripheral pulses palpable 2+ bilaterally    LABS:	 	                          15.6   11.77 )-----------( 210      ( 05 Oct 2018 18:45 )             46.8     10-05    140  |  99  |  12  ----------------------------<  136<H>  4.4   |  29  |  1.0    Ca    9.3      05 Oct 2018 18:45  Mg     1.8     10-05    TPro  7.5  /  Alb  4.1  /  TBili  0.5  /  DBili  x   /  AST  19  /  ALT  16  /  AlkPhos  120<H>  10-05    CARDIAC MARKERS ( 05 Oct 2018 18:45 )  x     / <0.01 ng/mL / x     / x     / x          PT/INR - ( 05 Oct 2018 18:45 )   PT: 11.00 sec;   INR: 1.02 ratio         PTT - ( 05 Oct 2018 18:45 )  PTT:26.6 sec    CARDIAC MARKERS:            TELEMETRY EVENTS: sinus tach with IVCD and frequent PVCs and paced complexes	    ECG:  	sinus tach with IVCD and some PVCs and paced complexes  RADIOLOGY: < from: CT Chest w/ IV Cont (10.06.18 @ 00:37) >  IMPRESSION:  1. No evidence of acute pulmonary embolism.  2. Right upper lobe 0.4 cm pulmonary nodule. Per Fleischner Society 2017   guidelines, no further recommendations provided in a low risk patient,   and consider optional CT chest in 12 months in a high risk patient   (history of smoking).    < end of copied text >    OTHER: 	    PREVIOUS DIAGNOSTIC TESTING:    [x ] Echocardiogram: P  [ ]  Catheterization:  [ ] Stress Test:  	  	    Home Medications:  atorvastatin:  (05 Oct 2018 18:46)  carvedilol:  (05 Oct 2018 18:46)  losartan:  (05 Oct 2018 18:46)  montelukast:  (05 Oct 2018 18:46)    MEDICATIONS  (STANDING):    MEDICATIONS  (PRN):

## 2018-10-06 NOTE — CONSULT NOTE ADULT - SUBJECTIVE AND OBJECTIVE BOX
LUIGI CASTREJON    Male    Patient is a 82y old  Male who presents with a chief complaint of bronchitis/COPD exacerbation (06 Oct 2018 01:07)      83 yo M with pmhx of asthma, COPD, ICD bibems presenting with sudden onset of shortness of breath associated with fever. Patient was hypoxic placed on CPAP given combovents and nitroglycerin. No cp, abdominal pain, nausea, vomiting, diarrhea, back pain, urinary symptoms, headache, dizziness, paresthesias, or weakness.      Allergies    No Known Allergies      Daily Height in cm: 172.72 (05 Oct 2018 18:43)    Daily       Marital Status:  ( x  )    (   ) Single    (   )    (  )   Occupation:   Lives with: (  ) alone  (  ) children   ( x ) spouse   (  ) parents  (  ) other  Recent Travel: no  Tobacco Usage:  (   ) never smoked   (  x ) former smoker   (   ) current smoker  (     ) pack year  Alcohol Usage:        Vital Signs Last 24 Hrs  T(C): 36.2 (06 Oct 2018 01:18), Max: 39.9 (05 Oct 2018 20:11)  T(F): 97.2 (06 Oct 2018 01:18), Max: 103.9 (05 Oct 2018 20:11)  HR: 75 (06 Oct 2018 01:18) (60 - 138)  BP: 168/74 (06 Oct 2018 01:18) (143/82 - 175/104)  BP(mean): 127 (05 Oct 2018 20:11) (127 - 127)  RR: 20 (06 Oct 2018 01:18) (20 - 24)  SpO2: 100% (06 Oct 2018 01:18) (98% - 100%)    REVIEW OF SYSTEMS:  CONSTITUTIONAL: No fever, weight loss, or fatigue  EYES: No eye pain, visual disturbances, or discharge  NECK: No pain or stiffness  RESPIRATORY: No cough, wheezing, +chills no hemoptysis; +shortness of breath  CARDIOVASCULAR: No chest pain, palpitations, dizziness, or leg swelling  GASTROINTESTINAL: No abdominal or epigastric pain. No nausea, vomiting, or hematemesis; No diarrhea or constipation. No melena or hematochezia.  GENITOURINARY: No dysuria, frequency, hematuria, or incontinence  NEUROLOGICAL: No headaches, memory loss, loss of strength, numbness, or tremors  MUSCULOSKELETAL: No joint pain or swelling; No muscle, back, or extremity pain        PT/INR - ( 05 Oct 2018 18:45 )   PT: 11.00 sec;   INR: 1.02 ratio         PTT - ( 05 Oct 2018 18:45 )  PTT:26.6 sec                          13.8   9.48  )-----------( 189      ( 06 Oct 2018 05:30 )             41.7       10    141  |  101  |  14  ----------------------------<  169<H>  4.0   |  27  |  0.9    Ca    8.6      06 Oct 2018 05:36  Mg     1.8     10    TPro  7.5  /  Alb  4.1  /  TBili  0.5  /  DBili  x   /  AST  19  /  ALT  16  /  AlkPhos  120<H>  10-05      CARDIAC MARKERS ( 05 Oct 2018 18:45 )  x     / <0.01 ng/mL / x     / x     / x            LIVER FUNCTIONS - ( 05 Oct 2018 18:45 )  Alb: 4.1 g/dL / Pro: 7.5 g/dL / ALK PHOS: 120 U/L / ALT: 16 U/L / AST: 19 U/L / GGT: x                 Urinalysis Basic - ( 05 Oct 2018 21:00 )    Color: Yellow / Appearance: Clear / S.020 / pH: x  Gluc: x / Ketone: Trace  / Bili: Negative / Urobili: 1.0 mg/dL   Blood: x / Protein: 30 mg/dL / Nitrite: Negative   Leuk Esterase: Negative / RBC: 1-2 /HPF / WBC 1-2 /HPF   Sq Epi: x / Non Sq Epi: Negative / Bacteria: Few        Radiology: Radiology personally reviewed. No obvious infiltrates    MEDICATIONS  (STANDING):  atorvastatin 40 milliGRAM(s) Oral at bedtime  carvedilol 12.5 milliGRAM(s) Oral every 12 hours  enoxaparin Injectable 40 milliGRAM(s) SubCutaneous every 24 hours  levoFLOXacin IVPB 500 milliGRAM(s) IV Intermittent every 24 hours  losartan 50 milliGRAM(s) Oral daily  methylPREDNISolone sodium succinate Injectable 80 milliGRAM(s) IV Push every 12 hours  montelukast 10 milliGRAM(s) Oral daily    MEDICATIONS  (PRN):  ALBUTerol/ipratropium for Nebulization 3 milliLiter(s) Nebulizer every 6 hours PRN Wheezing      Prescriptions:        PHYSICAL EXAM:  GENERAL: NAD, well-groomed, well-developed  HEAD:  Atraumatic, Normocephalic  EYES: EOMI, PERRLA, conjunctiva and sclera clear  ENMT: No tonsillar erythema, exudates, or enlargement; Moist mucous membranes, Good dentition, No lesions  NECK: Supple, No JVD, Normal thyroid  NERVOUS SYSTEM:  Alert & Oriented X3,  Motor Strength 5/5 B/L upper and lower extremities  CHEST/LUNG: Clear to percussion bilaterally; No rales, rhonchi, wheezing, or rubs though harsh breath sounds  HEART: Regular rate and rhythm; No murmurs, rubs, or gallops  ABDOMEN: Soft, Nontender, Nondistended; Bowel sounds present  EXTREMITIES:   No clubbing, cyanosis, or edema, full ROM  LYMPH: No lymphadenopathy noted in cervical or supraclavicular area  SKIN: No rashes or lesions observed

## 2018-10-06 NOTE — H&P ADULT - NSHPPHYSICALEXAM_GEN_ALL_CORE
T(F): 97.2  HR: 75  BP: 168/74  RR: 20  SpO2: 100%      PHYSICAL EXAM:  GENERAL: NAD, well-developed  HEAD:  Atraumatic, Normocephalic  EYES: EOMI, PERRLA, conjunctiva and sclera clear  NECK: Supple, No JVD  CHEST/LUNG: b/l rhonchi in lung bases  HEART: Regular rate and rhythm; No murmurs, rubs, or gallops  ABDOMEN: Soft, Nontender, Nondistended; Bowel sounds present  EXTREMITIES:  2+ Peripheral Pulses, No clubbing, cyanosis, or edema  PSYCH: AAOx3, forgetful  NEUROLOGY: non-focal  SKIN: No rashes or lesions

## 2018-10-06 NOTE — H&P ADULT - ASSESSMENT
82 y o m with PMH of asthma, COPD? , HTN , dld , CAD , PPM , and dementia presented to ER with c/o high grade fever with chills and SOB .       # FEVER / SOB / COUGH / TACHYCARDIA     ACUTE HYPOXIC RESPIRATORY FAILURE LIKELY SEC TO COPD EXACERBATION VRS UNCLEAR ETIOLOGY    - will c/w ICU care.  - IV abx .  - IV steroids.   - supplemental O2 by NC to keep saturation > 90%.  - nebs q 4hs and prn.  - f/u pancultures.  - will check rsv panel.    # HTN / TACHYCARDIA :    - will c/w home meds for now.  - will get 2 d echo .    # DLD:    - will c/w atorvastatin .    # CAD :    - will c/w home meds.    # DVT :    - ppx with lovenox.    # DISPO:    - full code from home.

## 2018-10-06 NOTE — H&P ADULT - HISTORY OF PRESENT ILLNESS
82 y o m with PMH of asthma, COPD? , HTN , dld , CAD , PPM , and dementia presented to ER with c/o high grade fever with chills and SOB . Pt states that he was in his usual state of health until Thursday when he developed high grade fever with chills. Pt also developed new cough that he explains as dry cough. Later during the day pt started having severe SOB ,  Pt was brought to ER in ER pt was found to be hypoxic , was placed on CPAP, was found to have a fever of 103 , with tachycardia , pt was given fluids. Pt had Ct chest done to r/o PE , ct was negative for PCN and PE . Pt was given ABX , solumedrol and was kept on BIPAP overnight with significant improvement in respiratory status.

## 2018-10-06 NOTE — CONSULT NOTE ADULT - ASSESSMENT
COPD exacerbation  - admit to ICU  - BIPAP overnight  - solumedrol q8h   - levaquin 500 mg po q24h  - CXR in AM  - blood cultures  - flu test    chronic systolic heart failure  - continue coreg, losartan, statin COPD exacerbation/ VIRAL ILLNESS    - admit to ICU  - BIPAP overnight repeat ABG  - solumedrol q8h   - Neb q 4 h  - levaquin 500 mg po q24h  - CXR in AM  - blood cultures  - RVP    chronic systolic heart failure  - continue coreg, losartan, statin    - DVT/ GI PROPHYLAXIS

## 2018-10-06 NOTE — CONSULT NOTE ADULT - ASSESSMENT
acute on chronic COPD with exacerbation  VIRAL ILLNESS    monitor in ICU  - NIPPV PRN  - solumedrol q8h   - Neb q 4 h  - levaquin 500 mg po q24h  - CXR in AM  - blood cultures  - RVP    chronic systolic heart failure  - continue coreg, losartan, statin  card management    - DVT/ GI PROPHYLAXIS    OOB as tolerated

## 2018-10-07 LAB
ANION GAP SERPL CALC-SCNC: 13 MMOL/L — SIGNIFICANT CHANGE UP (ref 7–14)
BUN SERPL-MCNC: 22 MG/DL — HIGH (ref 10–20)
CALCIUM SERPL-MCNC: 8.7 MG/DL — SIGNIFICANT CHANGE UP (ref 8.5–10.1)
CHLORIDE SERPL-SCNC: 102 MMOL/L — SIGNIFICANT CHANGE UP (ref 98–110)
CO2 SERPL-SCNC: 26 MMOL/L — SIGNIFICANT CHANGE UP (ref 17–32)
CREAT SERPL-MCNC: 1.1 MG/DL — SIGNIFICANT CHANGE UP (ref 0.7–1.5)
GLUCOSE BLDC GLUCOMTR-MCNC: 160 MG/DL — HIGH (ref 70–99)
GLUCOSE BLDC GLUCOMTR-MCNC: 165 MG/DL — HIGH (ref 70–99)
GLUCOSE BLDC GLUCOMTR-MCNC: 197 MG/DL — HIGH (ref 70–99)
GLUCOSE BLDC GLUCOMTR-MCNC: 204 MG/DL — HIGH (ref 70–99)
GLUCOSE BLDC GLUCOMTR-MCNC: 241 MG/DL — HIGH (ref 70–99)
GLUCOSE SERPL-MCNC: 171 MG/DL — HIGH (ref 70–99)
HCT VFR BLD CALC: 38.4 % — LOW (ref 42–52)
HGB BLD-MCNC: 13 G/DL — LOW (ref 14–18)
MCHC RBC-ENTMCNC: 30.9 PG — SIGNIFICANT CHANGE UP (ref 27–31)
MCHC RBC-ENTMCNC: 33.9 G/DL — SIGNIFICANT CHANGE UP (ref 32–37)
MCV RBC AUTO: 91.2 FL — SIGNIFICANT CHANGE UP (ref 80–94)
NRBC # BLD: 0 /100 WBCS — SIGNIFICANT CHANGE UP (ref 0–0)
PLATELET # BLD AUTO: 198 K/UL — SIGNIFICANT CHANGE UP (ref 130–400)
POTASSIUM SERPL-MCNC: 4 MMOL/L — SIGNIFICANT CHANGE UP (ref 3.5–5)
POTASSIUM SERPL-SCNC: 4 MMOL/L — SIGNIFICANT CHANGE UP (ref 3.5–5)
RBC # BLD: 4.21 M/UL — LOW (ref 4.7–6.1)
RBC # FLD: 13 % — SIGNIFICANT CHANGE UP (ref 11.5–14.5)
SODIUM SERPL-SCNC: 141 MMOL/L — SIGNIFICANT CHANGE UP (ref 135–146)
WBC # BLD: 18.1 K/UL — HIGH (ref 4.8–10.8)
WBC # FLD AUTO: 18.1 K/UL — HIGH (ref 4.8–10.8)

## 2018-10-07 RX ORDER — LATANOPROST 0.05 MG/ML
1 SOLUTION/ DROPS OPHTHALMIC; TOPICAL AT BEDTIME
Qty: 0 | Refills: 0 | Status: DISCONTINUED | OUTPATIENT
Start: 2018-10-07 | End: 2018-10-09

## 2018-10-07 RX ADMIN — BRIMONIDINE TARTRATE 1 DROP(S): 2 SOLUTION/ DROPS OPHTHALMIC at 05:56

## 2018-10-07 RX ADMIN — BRIMONIDINE TARTRATE 1 DROP(S): 2 SOLUTION/ DROPS OPHTHALMIC at 15:38

## 2018-10-07 RX ADMIN — DORZOLAMIDE HYDROCHLORIDE 1 DROP(S): 20 SOLUTION/ DROPS OPHTHALMIC at 15:38

## 2018-10-07 RX ADMIN — CARVEDILOL PHOSPHATE 12.5 MILLIGRAM(S): 80 CAPSULE, EXTENDED RELEASE ORAL at 21:16

## 2018-10-07 RX ADMIN — TAMSULOSIN HYDROCHLORIDE 0.4 MILLIGRAM(S): 0.4 CAPSULE ORAL at 21:16

## 2018-10-07 RX ADMIN — BRIMONIDINE TARTRATE 1 DROP(S): 2 SOLUTION/ DROPS OPHTHALMIC at 21:16

## 2018-10-07 RX ADMIN — LATANOPROST 1 DROP(S): 0.05 SOLUTION/ DROPS OPHTHALMIC; TOPICAL at 21:17

## 2018-10-07 RX ADMIN — LOSARTAN POTASSIUM 50 MILLIGRAM(S): 100 TABLET, FILM COATED ORAL at 09:29

## 2018-10-07 RX ADMIN — CARVEDILOL PHOSPHATE 12.5 MILLIGRAM(S): 80 CAPSULE, EXTENDED RELEASE ORAL at 09:29

## 2018-10-07 RX ADMIN — DORZOLAMIDE HYDROCHLORIDE 1 DROP(S): 20 SOLUTION/ DROPS OPHTHALMIC at 21:16

## 2018-10-07 RX ADMIN — ATORVASTATIN CALCIUM 40 MILLIGRAM(S): 80 TABLET, FILM COATED ORAL at 21:16

## 2018-10-07 RX ADMIN — Medication 80 MILLIGRAM(S): at 05:56

## 2018-10-07 RX ADMIN — DORZOLAMIDE HYDROCHLORIDE 1 DROP(S): 20 SOLUTION/ DROPS OPHTHALMIC at 05:56

## 2018-10-07 RX ADMIN — MONTELUKAST 10 MILLIGRAM(S): 4 TABLET, CHEWABLE ORAL at 11:21

## 2018-10-07 RX ADMIN — ENOXAPARIN SODIUM 40 MILLIGRAM(S): 100 INJECTION SUBCUTANEOUS at 09:29

## 2018-10-07 RX ADMIN — Medication 40 MILLIGRAM(S): at 17:10

## 2018-10-07 NOTE — PROGRESS NOTE ADULT - ASSESSMENT
82 y o m with PMH of asthma, COPD? , HTN , dld , CAD , PPM , and dementia presented to ER with c/o high grade fever with chills and SOB .       # FEVER / SOB / COUGH / TACHYCARDIA     ACUTE HYPOXIC RESPIRATORY FAILURE LIKELY SEC TO COPD EXACERBATION VRS UNCLEAR ETIOLOGY    -pt clinically improved , on room air now.  - will switch to po abx.  - urine and blood culture NGTD .  - will get RSV .  - will taper  IV steroids.   - NIPPV as needed .  - nebs q 4hs and prn.      # HTN / TACHYCARDIA :    - will c/w home meds for now.  - will get 2 d echo .  - pt reports that device was interrogated last year and he also had battery change .    # DLD:    - will c/w atorvastatin .    # CAD :    - will c/w home meds.    # DVT :    - ppx with lovenox.    # DISPO:    - full code from home.    stable to be transferred to medical floor.

## 2018-10-07 NOTE — PROGRESS NOTE ADULT - ASSESSMENT
acute on chronic COPD with exacerbation improving  VIRAL ILLNESS      - NIPPV PRN  - taper solumedrol   - Neb q 4 h  - levaquin 500 mg po q24h  - RVP    chronic systolic heart failure  - continue coreg, losartan, statin  card management    - DVT/ GI PROPHYLAXIS    OOB as tolerated    can go to [facility]

## 2018-10-07 NOTE — PROGRESS NOTE ADULT - SUBJECTIVE AND OBJECTIVE BOX
Patient is a 82y old  Male who presents with a chief complaint of Fever , chills and SOB (06 Oct 2018 08:14)        Interval Events: No overnight events.    REVIEW OF SYSTEMS:  Constitutional: No fevers or chills. No weight loss. No fatigue or generalized malaise.  Eyes: No itching or discharge from the eyes  ENT: No ear pain. No ear discharge. No nasal congestion. No post nasal drip. No epistaxis. No throat pain. No sore throat. No difficulty swallowing.   CV: No chest pain. No palpitations. No lightheadedness or dizziness.   Resp: No dyspnea at rest. No dyspnea on exertion. No orthopnea. No wheezing. No cough. No stridor. No sputum production. No chest pain with respiration.  GI: No nausea. No vomiting. No diarrhea.  MSK: No joint pain or pain in any extremities  Integumentary: No skin lesions. No pedal edema.  Neurological: No gross motor weakness. No sensory changes.      OBJECTIVE:  ICU Vital Signs Last 24 Hrs  T(C): 35.8 (07 Oct 2018 07:10), Max: 36.7 (06 Oct 2018 15:01)  T(F): 96.5 (07 Oct 2018 07:10), Max: 98 (06 Oct 2018 15:01)  HR: 71 (07 Oct 2018 07:22) (71 - 81)  BP: 175/80 (07 Oct 2018 07:22) (132/97 - 191/82)  BP(mean): 115 (07 Oct 2018 07:22) (89 - 124)  ABP: --  ABP(mean): --  RR: 26 (07 Oct 2018 07:22) (16 - 32)  SpO2: 96% (07 Oct 2018 07:22) (92% - 99%)        10-06 @ 07:01  -  10-07 @ 07:00  --------------------------------------------------------  IN: 590 mL / OUT: 300 mL / NET: 290 mL      CAPILLARY BLOOD GLUCOSE  241 (06 Oct 2018 21:04)          PHYSICAL EXAM:  General: Awake, alert, oriented X 3.   HEENT: Atraumatic, normocephalic.                 Mallampatti Grade                 No nasal congestion.                No tonsillar or pharyngeal exudates.  Lymph Nodes: No palpable lymphadenopathy neck, supraclavicular region  Neck: No JVD. No carotid bruit, no thyromegally  Respiratory: Normal chest expansion                         Normal percussion                         Normal and equal air entry                         +wheeze, no rhonchi +rales.  Cardiovascular: S1 S2 normal. No murmurs, rubs or gallops.   Abdomen: Soft, non-tender, non-distended. No organomegaly.  Extremities: Warm to touch. Peripheral pulse palpable. No pedal edema.   Skin: No rashes or skin lesions, warm dry  Neurological: Motor and sensory examination equal and normal in all four extremities.  Psychiatry: Appropriate mood and affect.    HOSPITAL MEDICATIONS:  MEDICATIONS  (STANDING):  atorvastatin 40 milliGRAM(s) Oral at bedtime  brimonidine 0.2% Ophthalmic Solution 1 Drop(s) Both EYES three times a day  carvedilol 12.5 milliGRAM(s) Oral every 12 hours  dorzolamide 2% Ophthalmic Solution 1 Drop(s) Both EYES three times a day  enoxaparin Injectable 40 milliGRAM(s) SubCutaneous every 24 hours  influenza   Vaccine 0.5 milliLiter(s) IntraMuscular once  latanoprost 0.005% Ophthalmic Solution 1 Drop(s) Both EYES at bedtime  levoFLOXacin IVPB 500 milliGRAM(s) IV Intermittent every 24 hours  losartan 50 milliGRAM(s) Oral daily  methylPREDNISolone sodium succinate Injectable 80 milliGRAM(s) IV Push every 12 hours  montelukast 10 milliGRAM(s) Oral daily  tamsulosin 0.4 milliGRAM(s) Oral at bedtime    MEDICATIONS  (PRN):  ALBUTerol/ipratropium for Nebulization 3 milliLiter(s) Nebulizer every 6 hours PRN Wheezing      LABS:                        13.0   18.10 )-----------( 198      ( 07 Oct 2018 05:44 )             38.4     10-07    141  |  102  |  22<H>  ----------------------------<  171<H>  4.0   |  26  |  1.1    Ca    8.7      07 Oct 2018 05:44  Mg     1.8     10-05    TPro  7.5  /  Alb  4.1  /  TBili  0.5  /  DBili  x   /  AST  19  /  ALT  16  /  AlkPhos  120<H>  10-    PT/INR - ( 05 Oct 2018 18:45 )   PT: 11.00 sec;   INR: 1.02 ratio         PTT - ( 05 Oct 2018 18:45 )  PTT:26.6 sec  Urinalysis Basic - ( 05 Oct 2018 21:00 )    Color: Yellow / Appearance: Clear / S.020 / pH: x  Gluc: x / Ketone: Trace  / Bili: Negative / Urobili: 1.0 mg/dL   Blood: x / Protein: 30 mg/dL / Nitrite: Negative   Leuk Esterase: Negative / RBC: 1-2 /HPF / WBC 1-2 /HPF   Sq Epi: x / Non Sq Epi: Negative / Bacteria: Few        Venous Blood Gas:  10-05 @ 18:43  7.36/56/19/32/30  VBG Lactate: 0.9              RADIOLOGY:Radiology personally reviewed.

## 2018-10-07 NOTE — PROGRESS NOTE ADULT - ATTENDING COMMENTS
Attending Statement: I have personally performed a face to face diagnostic evaluation on this patient. I have written the above note pertaining to the history, exam and plan of care.
Patient was evaluated and examined by bedside, independently, no c/o difficulty swallowing, c/o dyspnea at night, no dyspnea at rest during my examination,  no fever  All labs, radiology studies, VS was reviewed  GENERAL: NAD, AAOX3, patient is laying comfortably in bed  HEENT: AT, NC, PERRLA, SUPPLE, NO JVD, NO CB  LUNG: good breath sounds B/L  CVS: normal S1, S2, RRR, NO M/G/R  ABDOMEN: soft, bowel sounds present, normoactive in all 4 quadrants, non-tender, non-distended  EXT: no E/C/C, positive PP b/l extremities  NEURO: no acute focal neurological deficits  SKIN: no rash, no ecchymosis    I agree with medical plan outlined by Medical resident as stated above.  -continue oxygen, nebs, solumedrol taper. no antibiotics - since there is no pulmonary infiltrates. ? Upper resp. symptoms due to viral etiology  -consult GI for occasional odynophagia ? esophageal spasms  - Pulmonary specialist is following pt. ok for pt. to be downgraded to medical floor.  -f/up 2d echo

## 2018-10-07 NOTE — PROGRESS NOTE ADULT - SUBJECTIVE AND OBJECTIVE BOX
LENGTH OF HOSPITAL STAY: 1d    CHIEF COMPLAINT:   Patient is a 82y old  Male who presents with a chief complaint of Fever , chills and SOB (07 Oct 2018 08:27)      HISTORY OF PRESENTING ILLNESS:    HPI:  82 y o m with PMH of asthma, COPD? , HTN , dld , CAD , PPM , and dementia presented to ER with c/o high grade fever with chills and SOB . Pt states that he was in his usual state of health until Thursday when he developed high grade fever with chills. Pt also developed new cough that he explains as dry cough. Later during the day pt started having severe SOB ,  Pt was brought to ER in ER pt was found to be hypoxic , was placed on CPAP, was found to have a fever of 103 , with tachycardia , pt was given fluids. Pt had Ct chest done to r/o PE , ct was negative for PCN and PE . Pt was given ABX , solumedrol and was kept on BIPAP overnight with significant improvement in respiratory status. (06 Oct 2018 08:14)    PAST MEDICAL & SURGICAL HISTORY  PAST MEDICAL & SURGICAL HISTORY:  Glaucoma  COPD (chronic obstructive pulmonary disease)  Prostate disorder  Kidney stones  Asthma  History of appendectomy  History of right hip replacement  History of implantable cardioverter-defibrillator (ICD) placement    SOCIAL HISTORY:    ALLERGIES:  No Known Allergies    MEDICATIONS:  STANDING MEDICATIONS  atorvastatin 40 milliGRAM(s) Oral at bedtime  brimonidine 0.2% Ophthalmic Solution 1 Drop(s) Both EYES three times a day  carvedilol 12.5 milliGRAM(s) Oral every 12 hours  dorzolamide 2% Ophthalmic Solution 1 Drop(s) Both EYES three times a day  enoxaparin Injectable 40 milliGRAM(s) SubCutaneous every 24 hours  influenza   Vaccine 0.5 milliLiter(s) IntraMuscular once  latanoprost 0.005% Ophthalmic Solution 1 Drop(s) Both EYES at bedtime  levoFLOXacin  Tablet 500 milliGRAM(s) Oral every 24 hours  losartan 50 milliGRAM(s) Oral daily  methylPREDNISolone sodium succinate Injectable 40 milliGRAM(s) IV Push every 12 hours  montelukast 10 milliGRAM(s) Oral daily  tamsulosin 0.4 milliGRAM(s) Oral at bedtime    PRN MEDICATIONS  ALBUTerol/ipratropium for Nebulization 3 milliLiter(s) Nebulizer every 6 hours PRN    VITALS:   T(F): 96.5  HR: 71  BP: 175/80  RR: 22  SpO2: 97%    LABS:                        13.0   18.10 )-----------( 198      ( 07 Oct 2018 05:44 )             38.4     10    141  |  102  |  22<H>  ----------------------------<  171<H>  4.0   |  26  |  1.1    Ca    8.7      07 Oct 2018 05:44  Mg     1.8     10-    TPro  7.5  /  Alb  4.1  /  TBili  0.5  /  DBili  x   /  AST  19  /  ALT  16  /  AlkPhos  120<H>  10-05    PT/INR - ( 05 Oct 2018 18:45 )   PT: 11.00 sec;   INR: 1.02 ratio         PTT - ( 05 Oct 2018 18:45 )  PTT:26.6 sec  Urinalysis Basic - ( 05 Oct 2018 21:00 )    Color: Yellow / Appearance: Clear / S.020 / pH: x  Gluc: x / Ketone: Trace  / Bili: Negative / Urobili: 1.0 mg/dL   Blood: x / Protein: 30 mg/dL / Nitrite: Negative   Leuk Esterase: Negative / RBC: 1-2 /HPF / WBC 1-2 /HPF   Sq Epi: x / Non Sq Epi: Negative / Bacteria: Few            Culture - Urine (collected 05 Oct 2018 21:00)  Source: .Urine Clean Catch (Midstream)  Final Report (06 Oct 2018 22:53):    No growth    Culture - Blood (collected 05 Oct 2018 18:40)  Source: .Blood Blood  Preliminary Report (07 Oct 2018 03:01):    No growth to date.    Culture - Blood (collected 05 Oct 2018 18:40)  Source: .Blood Blood  Preliminary Report (07 Oct 2018 03:01):    No growth to date.      CARDIAC MARKERS ( 05 Oct 2018 18:45 )  x     / <0.01 ng/mL / x     / x     / x          RADIOLOGY:    PHYSICAL EXAM:  GEN: No acute distress  HEENT: nc / at  LUNGS: Clear to auscultation bilaterally   HEART: S1/S2 present. RRR.   ABD: Soft, non-tender, non-distended. Bowel sounds present  EXT: no edema , clubbing or cyanosis.  NEURO: AAOX3

## 2018-10-08 ENCOUNTER — TRANSCRIPTION ENCOUNTER (OUTPATIENT)
Age: 83
End: 2018-10-08

## 2018-10-08 LAB
GLUCOSE BLDC GLUCOMTR-MCNC: 150 MG/DL — HIGH (ref 70–99)
GLUCOSE BLDC GLUCOMTR-MCNC: 224 MG/DL — HIGH (ref 70–99)
GLUCOSE BLDC GLUCOMTR-MCNC: 233 MG/DL — HIGH (ref 70–99)
GLUCOSE BLDC GLUCOMTR-MCNC: 239 MG/DL — HIGH (ref 70–99)
GLUCOSE BLDC GLUCOMTR-MCNC: 405 MG/DL — HIGH (ref 70–99)
GLUCOSE BLDC GLUCOMTR-MCNC: 588 MG/DL — CRITICAL HIGH (ref 70–99)

## 2018-10-08 RX ORDER — ACYCLOVIR SODIUM 500 MG
800 VIAL (EA) INTRAVENOUS DAILY
Qty: 0 | Refills: 0 | Status: DISCONTINUED | OUTPATIENT
Start: 2018-10-08 | End: 2018-10-09

## 2018-10-08 RX ORDER — IPRATROPIUM/ALBUTEROL SULFATE 18-103MCG
3 AEROSOL WITH ADAPTER (GRAM) INHALATION EVERY 6 HOURS
Qty: 0 | Refills: 0 | Status: DISCONTINUED | OUTPATIENT
Start: 2018-10-08 | End: 2018-10-09

## 2018-10-08 RX ORDER — PANTOPRAZOLE SODIUM 20 MG/1
40 TABLET, DELAYED RELEASE ORAL
Qty: 0 | Refills: 0 | Status: DISCONTINUED | OUTPATIENT
Start: 2018-10-08 | End: 2018-10-09

## 2018-10-08 RX ADMIN — Medication 3 MILLILITER(S): at 19:09

## 2018-10-08 RX ADMIN — Medication 800 MILLIGRAM(S): at 14:59

## 2018-10-08 RX ADMIN — BRIMONIDINE TARTRATE 1 DROP(S): 2 SOLUTION/ DROPS OPHTHALMIC at 14:59

## 2018-10-08 RX ADMIN — DORZOLAMIDE HYDROCHLORIDE 1 DROP(S): 20 SOLUTION/ DROPS OPHTHALMIC at 05:25

## 2018-10-08 RX ADMIN — LATANOPROST 1 DROP(S): 0.05 SOLUTION/ DROPS OPHTHALMIC; TOPICAL at 22:11

## 2018-10-08 RX ADMIN — DORZOLAMIDE HYDROCHLORIDE 1 DROP(S): 20 SOLUTION/ DROPS OPHTHALMIC at 22:10

## 2018-10-08 RX ADMIN — DORZOLAMIDE HYDROCHLORIDE 1 DROP(S): 20 SOLUTION/ DROPS OPHTHALMIC at 14:59

## 2018-10-08 RX ADMIN — PANTOPRAZOLE SODIUM 40 MILLIGRAM(S): 20 TABLET, DELAYED RELEASE ORAL at 14:59

## 2018-10-08 RX ADMIN — BRIMONIDINE TARTRATE 1 DROP(S): 2 SOLUTION/ DROPS OPHTHALMIC at 22:10

## 2018-10-08 RX ADMIN — CARVEDILOL PHOSPHATE 12.5 MILLIGRAM(S): 80 CAPSULE, EXTENDED RELEASE ORAL at 11:48

## 2018-10-08 RX ADMIN — LOSARTAN POTASSIUM 50 MILLIGRAM(S): 100 TABLET, FILM COATED ORAL at 11:48

## 2018-10-08 RX ADMIN — Medication 40 MILLIGRAM(S): at 18:05

## 2018-10-08 RX ADMIN — MONTELUKAST 10 MILLIGRAM(S): 4 TABLET, CHEWABLE ORAL at 11:49

## 2018-10-08 RX ADMIN — CARVEDILOL PHOSPHATE 12.5 MILLIGRAM(S): 80 CAPSULE, EXTENDED RELEASE ORAL at 23:15

## 2018-10-08 RX ADMIN — ATORVASTATIN CALCIUM 40 MILLIGRAM(S): 80 TABLET, FILM COATED ORAL at 23:14

## 2018-10-08 RX ADMIN — BRIMONIDINE TARTRATE 1 DROP(S): 2 SOLUTION/ DROPS OPHTHALMIC at 05:25

## 2018-10-08 RX ADMIN — Medication 40 MILLIGRAM(S): at 05:25

## 2018-10-08 RX ADMIN — TAMSULOSIN HYDROCHLORIDE 0.4 MILLIGRAM(S): 0.4 CAPSULE ORAL at 23:15

## 2018-10-08 NOTE — DISCHARGE NOTE ADULT - MEDICATION SUMMARY - MEDICATIONS TO TAKE
I will START or STAY ON the medications listed below when I get home from the hospital:    predniSONE 20 mg oral tablet  -- 2 tab(s) by mouth once a day   -- It is very important that you take or use this exactly as directed.  Do not skip doses or discontinue unless directed by your doctor.  Obtain medical advice before taking any non-prescription drugs as some may affect the action of this medication.  Take with food or milk.    -- Indication: For COPD (chronic obstructive pulmonary disease)    losartan  -- Indication: For Hypetension    tamsulosin 0.4 mg oral capsule  -- 1 cap(s) by mouth once a day (at bedtime)  -- Indication: For bph    atorvastatin  -- Indication: For High cholesterol    carvedilol  -- Indication: For Htn    Breo Ellipta 200 mcg-25 mcg/inh inhalation powder  -- 1 puff(s) inhaled once a day   -- Check with your doctor before becoming pregnant.  Expires___________________  For inhalation only.  It is very important that you take or use this exactly as directed.  Do not skip doses or discontinue unless directed by your doctor.  Obtain medical advice before taking any non-prescription drugs as some may affect the action of this medication.  Rinse mouth thoroughly after use.    -- Indication: For COPD (chronic obstructive pulmonary disease)    ProAir HFA 90 mcg/inh inhalation aerosol  -- 1 puff(s) inhaled every 4 hours, As Needed -for shortness of breath and/or wheezing   -- For inhalation only.  It is very important that you take or use this exactly as directed.  Do not skip doses or discontinue unless directed by your doctor.  Obtain medical advice before taking any non-prescription drugs as some may affect the action of this medication.  Shake well before use.    -- Indication: For COPD (chronic obstructive pulmonary disease)    montelukast  -- Indication: For Asthma

## 2018-10-08 NOTE — PROGRESS NOTE ADULT - SUBJECTIVE AND OBJECTIVE BOX
LUIGI CASTREJON  [address] ([facility] 3I-ICU)            Patient was evaluated and examined  by bedside, c/o no dyspnea at night, no cough, tolerating diet well, no fever, c/o right upper lip lesions-itchy.          REVIEW OF SYSTEMS:  please see pertinent positives mentioned above, all other 12 ROS negative      T(C): , Max: 37.6 (10-07-18 @ 14:00)  HR: 84 (10-08-18 @ 05:08)  BP: 182/84 (10-08-18 @ 05:08)  RR: 16 (10-08-18 @ 05:08)  SpO2: --  CAPILLARY BLOOD GLUCOSE  204 (07 Oct 2018 22:45)      POCT Blood Glucose.: 150 mg/dL (08 Oct 2018 08:16)  POCT Blood Glucose.: 204 mg/dL (07 Oct 2018 21:43)  POCT Blood Glucose.: 160 mg/dL (07 Oct 2018 16:30)  POCT Blood Glucose.: 197 mg/dL (07 Oct 2018 12:15)      PHYSICAL EXAM:  General: NAD, AAOX3, patient is laying comfortably in bed  HEENT: AT, NC, Supple, NO JVD, NO CB  Lungs: good breath sounds B/L, mild b/l expiratory wheezing, no rhonchi  CVS: normal S1, S2, RRR, NO M/G/R  Abdomen: soft, bowel sounds present, non-tender, non-distended  Extremities: no edema, no clubbing, no cyanosis, positive peripheral pulses b/l  Neuro: no acute focal neurological deficits  Skin: no rush, no ecchymosis      LAB  CBC  Date: 10-07-18 @ 05:44  Mean cell Fcihqmkgug59.9  Mean cell Hemoglobin Conc33.9  Mean cell Volum 91.2  Platelet count-Automate 198  RBC Count 4.21  Red Cell Distrib Width13.0  WBC Count18.10  % Albumin, Urine--  Hematocrit 38.4  Hemoglobin 13.0  CBC  Date: 10-06-18 @ 05:30  Mean cell Rvvsikhseg73.3  Mean cell Hemoglobin Conc33.1  Mean cell Volum 91.6  Platelet count-Automate 189  RBC Count 4.55  Red Cell Distrib Width12.9  WBC Count9.48  % Albumin, Urine--  Hematocrit 41.7  Hemoglobin 13.8  CBC  Date: 10-05-18 @ 18:45  Mean cell Ofhnqwtnhf95.8  Mean cell Hemoglobin Conc33.3  Mean cell Volum 92.3  Platelet count-Automate 210  RBC Count 5.07  Red Cell Distrib Width13.0  WBC Count11.77  % Albumin, Urine--  Hematocrit 46.8  Hemoglobin 15.6    BMP  10-07-18 @ 05:44  Blood Gas Arterial-Calcium,Ionized--  Blood Urea Nitrogen, Serum 22 mg/dL<H> [10 - 20]  Carbon Dioxide, Serum26 mmol/L [17 - 32]  Chloride, Distb936 mmol/L [98 - 110]  Creatinie, Serum1.1 mg/dL [0.7 - 1.5]  Glucose, Llyoe995 mg/dL<H> [70 - 99]  Potassium, Serum4.0 mmol/L [3.5 - 5.0]  Sodium, Serum 141 mmol/L [135 - 146]  BMP  10-06-18 @ 05:36  Blood Gas Arterial-Calcium,Ionized--  Blood Urea Nitrogen, Serum 14 mg/dL [10 - 20]  Carbon Dioxide, Serum27 mmol/L [17 - 32]  Chloride, Revxl354 mmol/L [98 - 110]  Creatinie, Serum0.9 mg/dL [0.7 - 1.5]  Glucose, Sznra351 mg/dL<H> [70 - 99]  Potassium, Serum4.0 mmol/L [3.5 - 5.0]  Sodium, Serum 141 mmol/L [135 - 146]  BMP  10-05-18 @ 18:45  Blood Gas Arterial-Calcium,Ionized--  Blood Urea Nitrogen, Serum 12 mg/dL [10 - 20]  Carbon Dioxide, Serum29 mmol/L [17 - 32]  Chloride, Serum99 mmol/L [98 - 110]  Creatinie, Serum1.0 mg/dL [0.7 - 1.5]  Glucose, Bhklt125 mg/dL<H> [70 - 99]  Potassium, Serum4.4 mmol/L [3.5 - 5.0]  Sodium, Serum 140 mmol/L [135 - 146]              Microbiology:    Culture - Urine (collected 10-05-18 @ 21:00)  Source: .Urine Clean Catch (Midstream)  Final Report (10-06-18 @ 22:53):    No growth    Culture - Blood (collected 10-05-18 @ 18:40)  Source: .Blood Blood  Preliminary Report (10-07-18 @ 03:01):    No growth to date.    Culture - Blood (collected 10-05-18 @ 18:40)  Source: .Blood Blood  Preliminary Report (10-07-18 @ 03:01):    No growth to date.      Medications:  acyclovir   Oral Tab/Cap 800 milliGRAM(s) Oral daily  ALBUTerol/ipratropium for Nebulization 3 milliLiter(s) Nebulizer every 6 hours  atorvastatin 40 milliGRAM(s) Oral at bedtime  brimonidine 0.2% Ophthalmic Solution 1 Drop(s) Both EYES three times a day  carvedilol 12.5 milliGRAM(s) Oral every 12 hours  dorzolamide 2% Ophthalmic Solution 1 Drop(s) Both EYES three times a day  enoxaparin Injectable 40 milliGRAM(s) SubCutaneous every 24 hours  influenza   Vaccine 0.5 milliLiter(s) IntraMuscular once  latanoprost 0.005% Ophthalmic Solution 1 Drop(s) Right EYE at bedtime  levoFLOXacin  Tablet 500 milliGRAM(s) Oral every 24 hours  losartan 50 milliGRAM(s) Oral daily  methylPREDNISolone sodium succinate Injectable 40 milliGRAM(s) IV Push every 12 hours  montelukast 10 milliGRAM(s) Oral daily  pantoprazole    Tablet 40 milliGRAM(s) Oral before breakfast  tamsulosin 0.4 milliGRAM(s) Oral at bedtime        Assessment and Plan:  82 y o m with PMH of asthma, HTN , dld , CAD , PPM   presented to ER with c/o high grade fever with chills and SOB .       ACUTE HYPOXIC RESPIRATORY FAILURE LIKELY SECONDARY TO ACUTE ASTHMA EXACERBATION WITH ACUTE BRONCHOSPASM    -pt clinically improved , on room air now.  - urine and blood culture NGTD .  - will taper  IV steroids, as tolerated, patient is still wheezing today, will continue IV Steroids for next 24 hours  -pulse ox monitoring  - nebs every 6 hours  -outpatient Pulmonary specialist f/up  -CT chest- no opacities, small nodule 0.4 cm in right upper lobe.      #acute oral herpes flare - Acyclovir x 5 days    #Episodic odynophagia- not occurred during stay  -will need outpatient GI f/up      # HTN     - will c/w home meds for now.  - f/up  2 d echo .  - pt reports that device was interrogated last year and he also had battery change .    # DLD:    - will c/w atorvastatin .    # CAD :    - will c/w home meds.    # DVT :    - ppx with lovenox.    # DISPO: for possible d/c home in 24 hours    - full code from home.

## 2018-10-08 NOTE — DISCHARGE NOTE ADULT - HOSPITAL COURSE
Elderly male presented with severe respiratory distress. Admitted to ICU and treated for COPD exacerbation with IV steroids and antibiotics. Patient improved significantly and is stable for discharge.

## 2018-10-08 NOTE — DISCHARGE NOTE ADULT - PLAN OF CARE
complete treatment and prevent recurrence take prednisone 40mg daily for 5 more days only   take Breo Ellipta daily   albuterol as needed   outpatient pulmonary follow up

## 2018-10-08 NOTE — DISCHARGE NOTE ADULT - PATIENT PORTAL LINK FT
You can access the [facility] Portal, offered by [facility], by registering with the following website: [email or web address]

## 2018-10-08 NOTE — DISCHARGE NOTE ADULT - CARE PLAN
Principal Discharge DX:	Chronic obstructive pulmonary disease with acute exacerbation  Goal:	complete treatment and prevent recurrence  Assessment and plan of treatment:	take prednisone 40mg daily for 5 more days only   take Breo Ellipta daily   albuterol as needed   outpatient pulmonary follow up

## 2018-10-08 NOTE — DISCHARGE NOTE ADULT - CARE PROVIDER_API CALL
Awais Echevarria), Internal Medicine; Pulmonary Disease  [address] [place] [address]  Phone: [phone]  Fax: [phone]

## 2018-10-09 VITALS
TEMPERATURE: 96 F | RESPIRATION RATE: 16 BRPM | HEART RATE: 86 BPM | DIASTOLIC BLOOD PRESSURE: 79 MMHG | SYSTOLIC BLOOD PRESSURE: 155 MMHG

## 2018-10-09 LAB
GLUCOSE BLDC GLUCOMTR-MCNC: 153 MG/DL — HIGH (ref 70–99)
GLUCOSE BLDC GLUCOMTR-MCNC: 182 MG/DL — HIGH (ref 70–99)

## 2018-10-09 RX ORDER — TAMSULOSIN HYDROCHLORIDE 0.4 MG/1
1 CAPSULE ORAL
Qty: 0 | Refills: 0 | COMMUNITY
Start: 2018-10-09

## 2018-10-09 RX ORDER — ALBUTEROL 90 UG/1
1 AEROSOL, METERED ORAL
Qty: 1 | Refills: 2 | OUTPATIENT
Start: 2018-10-09

## 2018-10-09 RX ORDER — FLUTICASONE FUROATE AND VILANTEROL TRIFENATATE 100; 25 UG/1; UG/1
1 POWDER RESPIRATORY (INHALATION)
Qty: 1 | Refills: 2 | OUTPATIENT
Start: 2018-10-09 | End: 2019-01-06

## 2018-10-09 RX ADMIN — Medication 800 MILLIGRAM(S): at 11:48

## 2018-10-09 RX ADMIN — Medication 3 MILLILITER(S): at 07:27

## 2018-10-09 RX ADMIN — DORZOLAMIDE HYDROCHLORIDE 1 DROP(S): 20 SOLUTION/ DROPS OPHTHALMIC at 05:13

## 2018-10-09 RX ADMIN — BRIMONIDINE TARTRATE 1 DROP(S): 2 SOLUTION/ DROPS OPHTHALMIC at 13:56

## 2018-10-09 RX ADMIN — PANTOPRAZOLE SODIUM 40 MILLIGRAM(S): 20 TABLET, DELAYED RELEASE ORAL at 05:14

## 2018-10-09 RX ADMIN — ENOXAPARIN SODIUM 40 MILLIGRAM(S): 100 INJECTION SUBCUTANEOUS at 09:09

## 2018-10-09 RX ADMIN — MONTELUKAST 10 MILLIGRAM(S): 4 TABLET, CHEWABLE ORAL at 11:48

## 2018-10-09 RX ADMIN — CARVEDILOL PHOSPHATE 12.5 MILLIGRAM(S): 80 CAPSULE, EXTENDED RELEASE ORAL at 09:09

## 2018-10-09 RX ADMIN — DORZOLAMIDE HYDROCHLORIDE 1 DROP(S): 20 SOLUTION/ DROPS OPHTHALMIC at 13:57

## 2018-10-09 RX ADMIN — INFLUENZA VIRUS VACCINE 0.5 MILLILITER(S): 15; 15; 15; 15 SUSPENSION INTRAMUSCULAR at 16:04

## 2018-10-09 RX ADMIN — Medication 3 MILLILITER(S): at 14:21

## 2018-10-09 RX ADMIN — LOSARTAN POTASSIUM 50 MILLIGRAM(S): 100 TABLET, FILM COATED ORAL at 09:09

## 2018-10-09 RX ADMIN — BRIMONIDINE TARTRATE 1 DROP(S): 2 SOLUTION/ DROPS OPHTHALMIC at 05:13

## 2018-10-11 LAB
CULTURE RESULTS: SIGNIFICANT CHANGE UP
CULTURE RESULTS: SIGNIFICANT CHANGE UP
SPECIMEN SOURCE: SIGNIFICANT CHANGE UP
SPECIMEN SOURCE: SIGNIFICANT CHANGE UP

## 2018-10-23 DIAGNOSIS — A41.9 SEPSIS, UNSPECIFIED ORGANISM: ICD-10-CM

## 2018-10-23 DIAGNOSIS — Z87.891 PERSONAL HISTORY OF NICOTINE DEPENDENCE: ICD-10-CM

## 2018-10-23 DIAGNOSIS — J96.01 ACUTE RESPIRATORY FAILURE WITH HYPOXIA: ICD-10-CM

## 2018-10-23 DIAGNOSIS — I25.10 ATHEROSCLEROTIC HEART DISEASE OF NATIVE CORONARY ARTERY WITHOUT ANGINA PECTORIS: ICD-10-CM

## 2018-10-23 DIAGNOSIS — E78.5 HYPERLIPIDEMIA, UNSPECIFIED: ICD-10-CM

## 2018-10-23 DIAGNOSIS — Z95.810 PRESENCE OF AUTOMATIC (IMPLANTABLE) CARDIAC DEFIBRILLATOR: ICD-10-CM

## 2018-10-23 DIAGNOSIS — B37.0 CANDIDAL STOMATITIS: ICD-10-CM

## 2018-10-23 DIAGNOSIS — I45.10 UNSPECIFIED RIGHT BUNDLE-BRANCH BLOCK: ICD-10-CM

## 2018-10-23 DIAGNOSIS — I47.2 VENTRICULAR TACHYCARDIA: ICD-10-CM

## 2018-10-23 DIAGNOSIS — J44.1 CHRONIC OBSTRUCTIVE PULMONARY DISEASE WITH (ACUTE) EXACERBATION: ICD-10-CM

## 2018-10-23 DIAGNOSIS — I11.0 HYPERTENSIVE HEART DISEASE WITH HEART FAILURE: ICD-10-CM

## 2018-10-23 DIAGNOSIS — I50.22 CHRONIC SYSTOLIC (CONGESTIVE) HEART FAILURE: ICD-10-CM

## 2018-10-23 DIAGNOSIS — J45.909 UNSPECIFIED ASTHMA, UNCOMPLICATED: ICD-10-CM

## 2019-04-19 ENCOUNTER — INPATIENT (INPATIENT)
Facility: HOSPITAL | Age: 84
LOS: 2 days | Discharge: HOME | End: 2019-04-22
Attending: HOSPITALIST | Admitting: HOSPITALIST
Payer: MEDICARE

## 2019-04-19 VITALS
HEART RATE: 60 BPM | TEMPERATURE: 96 F | OXYGEN SATURATION: 99 % | SYSTOLIC BLOOD PRESSURE: 178 MMHG | DIASTOLIC BLOOD PRESSURE: 96 MMHG | RESPIRATION RATE: 16 BRPM

## 2019-04-19 DIAGNOSIS — Z90.49 ACQUIRED ABSENCE OF OTHER SPECIFIED PARTS OF DIGESTIVE TRACT: Chronic | ICD-10-CM

## 2019-04-19 DIAGNOSIS — Z95.810 PRESENCE OF AUTOMATIC (IMPLANTABLE) CARDIAC DEFIBRILLATOR: Chronic | ICD-10-CM

## 2019-04-19 DIAGNOSIS — Z96.641 PRESENCE OF RIGHT ARTIFICIAL HIP JOINT: Chronic | ICD-10-CM

## 2019-04-19 PROBLEM — H40.9 UNSPECIFIED GLAUCOMA: Chronic | Status: ACTIVE | Noted: 2018-10-06

## 2019-04-19 PROBLEM — J44.9 CHRONIC OBSTRUCTIVE PULMONARY DISEASE, UNSPECIFIED: Chronic | Status: ACTIVE | Noted: 2018-10-05

## 2019-04-19 LAB
ALBUMIN SERPL ELPH-MCNC: 3.5 G/DL — SIGNIFICANT CHANGE UP (ref 3.5–5.2)
ALP SERPL-CCNC: 96 U/L — SIGNIFICANT CHANGE UP (ref 30–115)
ALT FLD-CCNC: 15 U/L — SIGNIFICANT CHANGE UP (ref 0–41)
ANION GAP SERPL CALC-SCNC: 11 MMOL/L — SIGNIFICANT CHANGE UP (ref 7–14)
APTT BLD: 29.6 SEC — SIGNIFICANT CHANGE UP (ref 27–39.2)
AST SERPL-CCNC: 32 U/L — SIGNIFICANT CHANGE UP (ref 0–41)
BASE EXCESS BLDV CALC-SCNC: 0.9 MMOL/L — SIGNIFICANT CHANGE UP (ref -2–2)
BASOPHILS # BLD AUTO: 0.04 K/UL — SIGNIFICANT CHANGE UP (ref 0–0.2)
BASOPHILS NFR BLD AUTO: 0.5 % — SIGNIFICANT CHANGE UP (ref 0–1)
BILIRUB SERPL-MCNC: 0.2 MG/DL — SIGNIFICANT CHANGE UP (ref 0.2–1.2)
BUN SERPL-MCNC: 14 MG/DL — SIGNIFICANT CHANGE UP (ref 10–20)
CA-I SERPL-SCNC: 1.24 MMOL/L — SIGNIFICANT CHANGE UP (ref 1.12–1.3)
CALCIUM SERPL-MCNC: 8.5 MG/DL — SIGNIFICANT CHANGE UP (ref 8.5–10.1)
CHLORIDE SERPL-SCNC: 108 MMOL/L — SIGNIFICANT CHANGE UP (ref 98–110)
CO2 SERPL-SCNC: 19 MMOL/L — SIGNIFICANT CHANGE UP (ref 17–32)
CREAT SERPL-MCNC: 1 MG/DL — SIGNIFICANT CHANGE UP (ref 0.7–1.5)
EOSINOPHIL # BLD AUTO: 0.12 K/UL — SIGNIFICANT CHANGE UP (ref 0–0.7)
EOSINOPHIL NFR BLD AUTO: 1.5 % — SIGNIFICANT CHANGE UP (ref 0–8)
GAS PNL BLDV: 142 MMOL/L — SIGNIFICANT CHANGE UP (ref 136–145)
GAS PNL BLDV: SIGNIFICANT CHANGE UP
GLUCOSE SERPL-MCNC: 126 MG/DL — HIGH (ref 70–99)
HCO3 BLDV-SCNC: 30 MMOL/L — HIGH (ref 22–29)
HCT VFR BLD CALC: 42.3 % — SIGNIFICANT CHANGE UP (ref 42–52)
HCT VFR BLDA CALC: 45.4 % — HIGH (ref 34–44)
HGB BLD CALC-MCNC: 14.8 G/DL — SIGNIFICANT CHANGE UP (ref 14–18)
HGB BLD-MCNC: 13.7 G/DL — LOW (ref 14–18)
IMM GRANULOCYTES NFR BLD AUTO: 0.4 % — HIGH (ref 0.1–0.3)
INR BLD: 0.91 RATIO — SIGNIFICANT CHANGE UP (ref 0.65–1.3)
LACTATE BLDV-MCNC: 1.2 MMOL/L — SIGNIFICANT CHANGE UP (ref 0.5–1.6)
LYMPHOCYTES # BLD AUTO: 1.6 K/UL — SIGNIFICANT CHANGE UP (ref 1.2–3.4)
LYMPHOCYTES # BLD AUTO: 19.7 % — LOW (ref 20.5–51.1)
MCHC RBC-ENTMCNC: 30.1 PG — SIGNIFICANT CHANGE UP (ref 27–31)
MCHC RBC-ENTMCNC: 32.4 G/DL — SIGNIFICANT CHANGE UP (ref 32–37)
MCV RBC AUTO: 93 FL — SIGNIFICANT CHANGE UP (ref 80–94)
MONOCYTES # BLD AUTO: 0.48 K/UL — SIGNIFICANT CHANGE UP (ref 0.1–0.6)
MONOCYTES NFR BLD AUTO: 5.9 % — SIGNIFICANT CHANGE UP (ref 1.7–9.3)
NEUTROPHILS # BLD AUTO: 5.86 K/UL — SIGNIFICANT CHANGE UP (ref 1.4–6.5)
NEUTROPHILS NFR BLD AUTO: 72 % — SIGNIFICANT CHANGE UP (ref 42.2–75.2)
NRBC # BLD: 0 /100 WBCS — SIGNIFICANT CHANGE UP (ref 0–0)
PCO2 BLDV: 64 MMHG — HIGH (ref 41–51)
PH BLDV: 7.28 — SIGNIFICANT CHANGE UP (ref 7.26–7.43)
PLATELET # BLD AUTO: 222 K/UL — SIGNIFICANT CHANGE UP (ref 130–400)
PO2 BLDV: 23 MMHG — SIGNIFICANT CHANGE UP (ref 20–40)
POTASSIUM BLDV-SCNC: 4.2 MMOL/L — SIGNIFICANT CHANGE UP (ref 3.3–5.6)
POTASSIUM SERPL-MCNC: 7.1 MMOL/L — CRITICAL HIGH (ref 3.5–5)
POTASSIUM SERPL-SCNC: 7.1 MMOL/L — CRITICAL HIGH (ref 3.5–5)
PROT SERPL-MCNC: 6.5 G/DL — SIGNIFICANT CHANGE UP (ref 6–8)
PROTHROM AB SERPL-ACNC: 10.5 SEC — SIGNIFICANT CHANGE UP (ref 9.95–12.87)
RBC # BLD: 4.55 M/UL — LOW (ref 4.7–6.1)
RBC # FLD: 13.2 % — SIGNIFICANT CHANGE UP (ref 11.5–14.5)
SAO2 % BLDV: 40 % — SIGNIFICANT CHANGE UP
SODIUM SERPL-SCNC: 138 MMOL/L — SIGNIFICANT CHANGE UP (ref 135–146)
TROPONIN T SERPL-MCNC: <0.01 NG/ML — SIGNIFICANT CHANGE UP
WBC # BLD: 8.13 K/UL — SIGNIFICANT CHANGE UP (ref 4.8–10.8)
WBC # FLD AUTO: 8.13 K/UL — SIGNIFICANT CHANGE UP (ref 4.8–10.8)

## 2019-04-19 PROCEDURE — 99285 EMERGENCY DEPT VISIT HI MDM: CPT | Mod: 25

## 2019-04-19 PROCEDURE — 93289 INTERROG DEVICE EVAL HEART: CPT | Mod: 26

## 2019-04-19 PROCEDURE — 72170 X-RAY EXAM OF PELVIS: CPT | Mod: 26,59

## 2019-04-19 PROCEDURE — 73552 X-RAY EXAM OF FEMUR 2/>: CPT | Mod: 26,RT

## 2019-04-19 PROCEDURE — 73502 X-RAY EXAM HIP UNI 2-3 VIEWS: CPT | Mod: 26,RT

## 2019-04-19 PROCEDURE — 71045 X-RAY EXAM CHEST 1 VIEW: CPT | Mod: 26

## 2019-04-19 PROCEDURE — 99152 MOD SED SAME PHYS/QHP 5/>YRS: CPT

## 2019-04-19 RX ORDER — CARVEDILOL PHOSPHATE 80 MG/1
0 CAPSULE, EXTENDED RELEASE ORAL
Qty: 0 | Refills: 0 | COMMUNITY

## 2019-04-19 RX ORDER — BRIMONIDINE TARTRATE 2 MG/MG
1 SOLUTION/ DROPS OPHTHALMIC THREE TIMES A DAY
Qty: 0 | Refills: 0 | Status: DISCONTINUED | OUTPATIENT
Start: 2019-04-19 | End: 2019-04-22

## 2019-04-19 RX ORDER — BUDESONIDE AND FORMOTEROL FUMARATE DIHYDRATE 160; 4.5 UG/1; UG/1
2 AEROSOL RESPIRATORY (INHALATION)
Qty: 0 | Refills: 0 | Status: DISCONTINUED | OUTPATIENT
Start: 2019-04-19 | End: 2019-04-22

## 2019-04-19 RX ORDER — DORZOLAMIDE HYDROCHLORIDE 20 MG/ML
1 SOLUTION/ DROPS OPHTHALMIC
Qty: 0 | Refills: 0 | COMMUNITY

## 2019-04-19 RX ORDER — CARVEDILOL PHOSPHATE 80 MG/1
25 CAPSULE, EXTENDED RELEASE ORAL EVERY 12 HOURS
Qty: 0 | Refills: 0 | Status: DISCONTINUED | OUTPATIENT
Start: 2019-04-19 | End: 2019-04-22

## 2019-04-19 RX ORDER — MORPHINE SULFATE 50 MG/1
4 CAPSULE, EXTENDED RELEASE ORAL ONCE
Qty: 0 | Refills: 0 | Status: DISCONTINUED | OUTPATIENT
Start: 2019-04-19 | End: 2019-04-19

## 2019-04-19 RX ORDER — MORPHINE SULFATE 50 MG/1
1 CAPSULE, EXTENDED RELEASE ORAL ONCE
Qty: 0 | Refills: 0 | Status: DISCONTINUED | OUTPATIENT
Start: 2019-04-19 | End: 2019-04-19

## 2019-04-19 RX ORDER — CHOLECALCIFEROL (VITAMIN D3) 125 MCG
1000 CAPSULE ORAL DAILY
Qty: 0 | Refills: 0 | Status: DISCONTINUED | OUTPATIENT
Start: 2019-04-19 | End: 2019-04-19

## 2019-04-19 RX ORDER — ATORVASTATIN CALCIUM 80 MG/1
10 TABLET, FILM COATED ORAL AT BEDTIME
Qty: 0 | Refills: 0 | Status: DISCONTINUED | OUTPATIENT
Start: 2019-04-19 | End: 2019-04-19

## 2019-04-19 RX ORDER — ONDANSETRON 8 MG/1
4 TABLET, FILM COATED ORAL ONCE
Qty: 0 | Refills: 0 | Status: DISCONTINUED | OUTPATIENT
Start: 2019-04-19 | End: 2019-04-19

## 2019-04-19 RX ORDER — MONTELUKAST 4 MG/1
10 TABLET, CHEWABLE ORAL DAILY
Qty: 0 | Refills: 0 | Status: DISCONTINUED | OUTPATIENT
Start: 2019-04-19 | End: 2019-04-19

## 2019-04-19 RX ORDER — BUDESONIDE AND FORMOTEROL FUMARATE DIHYDRATE 160; 4.5 UG/1; UG/1
2 AEROSOL RESPIRATORY (INHALATION)
Qty: 0 | Refills: 0 | Status: DISCONTINUED | OUTPATIENT
Start: 2019-04-19 | End: 2019-04-19

## 2019-04-19 RX ORDER — AMLODIPINE BESYLATE 2.5 MG/1
2.5 TABLET ORAL ONCE
Qty: 0 | Refills: 0 | Status: COMPLETED | OUTPATIENT
Start: 2019-04-19 | End: 2019-04-19

## 2019-04-19 RX ORDER — LATANOPROST 0.05 MG/ML
1 SOLUTION/ DROPS OPHTHALMIC; TOPICAL AT BEDTIME
Qty: 0 | Refills: 0 | Status: DISCONTINUED | OUTPATIENT
Start: 2019-04-19 | End: 2019-04-19

## 2019-04-19 RX ORDER — DORZOLAMIDE HYDROCHLORIDE 20 MG/ML
1 SOLUTION/ DROPS OPHTHALMIC THREE TIMES A DAY
Qty: 0 | Refills: 0 | Status: DISCONTINUED | OUTPATIENT
Start: 2019-04-19 | End: 2019-04-19

## 2019-04-19 RX ORDER — BRIMONIDINE TARTRATE 2 MG/MG
1 SOLUTION/ DROPS OPHTHALMIC THREE TIMES A DAY
Qty: 0 | Refills: 0 | Status: DISCONTINUED | OUTPATIENT
Start: 2019-04-19 | End: 2019-04-19

## 2019-04-19 RX ORDER — TAMSULOSIN HYDROCHLORIDE 0.4 MG/1
0.4 CAPSULE ORAL AT BEDTIME
Qty: 0 | Refills: 0 | Status: DISCONTINUED | OUTPATIENT
Start: 2019-04-19 | End: 2019-04-19

## 2019-04-19 RX ORDER — CARVEDILOL PHOSPHATE 80 MG/1
25 CAPSULE, EXTENDED RELEASE ORAL EVERY 12 HOURS
Qty: 0 | Refills: 0 | Status: DISCONTINUED | OUTPATIENT
Start: 2019-04-19 | End: 2019-04-19

## 2019-04-19 RX ORDER — ASPIRIN/CALCIUM CARB/MAGNESIUM 324 MG
81 TABLET ORAL DAILY
Qty: 0 | Refills: 0 | Status: DISCONTINUED | OUTPATIENT
Start: 2019-04-19 | End: 2019-04-19

## 2019-04-19 RX ORDER — HYDROMORPHONE HYDROCHLORIDE 2 MG/ML
1 INJECTION INTRAMUSCULAR; INTRAVENOUS; SUBCUTANEOUS ONCE
Qty: 0 | Refills: 0 | Status: DISCONTINUED | OUTPATIENT
Start: 2019-04-19 | End: 2019-04-19

## 2019-04-19 RX ORDER — LOSARTAN POTASSIUM 100 MG/1
100 TABLET, FILM COATED ORAL
Qty: 0 | Refills: 0 | COMMUNITY

## 2019-04-19 RX ORDER — OXYCODONE AND ACETAMINOPHEN 5; 325 MG/1; MG/1
1 TABLET ORAL ONCE
Qty: 0 | Refills: 0 | Status: DISCONTINUED | OUTPATIENT
Start: 2019-04-19 | End: 2019-04-19

## 2019-04-19 RX ORDER — LOSARTAN POTASSIUM 100 MG/1
0 TABLET, FILM COATED ORAL
Qty: 0 | Refills: 0 | COMMUNITY

## 2019-04-19 RX ORDER — ATORVASTATIN CALCIUM 80 MG/1
10 TABLET, FILM COATED ORAL
Qty: 0 | Refills: 0 | COMMUNITY

## 2019-04-19 RX ORDER — DORZOLAMIDE HYDROCHLORIDE 20 MG/ML
1 SOLUTION/ DROPS OPHTHALMIC THREE TIMES A DAY
Qty: 0 | Refills: 0 | Status: DISCONTINUED | OUTPATIENT
Start: 2019-04-19 | End: 2019-04-22

## 2019-04-19 RX ORDER — ATORVASTATIN CALCIUM 80 MG/1
0 TABLET, FILM COATED ORAL
Qty: 0 | Refills: 0 | COMMUNITY

## 2019-04-19 RX ORDER — MORPHINE SULFATE 50 MG/1
4 CAPSULE, EXTENDED RELEASE ORAL EVERY 4 HOURS
Qty: 0 | Refills: 0 | Status: DISCONTINUED | OUTPATIENT
Start: 2019-04-19 | End: 2019-04-19

## 2019-04-19 RX ORDER — MORPHINE SULFATE 50 MG/1
2 CAPSULE, EXTENDED RELEASE ORAL
Qty: 0 | Refills: 0 | Status: DISCONTINUED | OUTPATIENT
Start: 2019-04-19 | End: 2019-04-19

## 2019-04-19 RX ORDER — BRIMONIDINE TARTRATE 2 MG/MG
1 SOLUTION/ DROPS OPHTHALMIC
Qty: 0 | Refills: 0 | COMMUNITY

## 2019-04-19 RX ORDER — LOSARTAN POTASSIUM 100 MG/1
100 TABLET, FILM COATED ORAL DAILY
Qty: 0 | Refills: 0 | Status: DISCONTINUED | OUTPATIENT
Start: 2019-04-19 | End: 2019-04-19

## 2019-04-19 RX ORDER — OXYCODONE AND ACETAMINOPHEN 5; 325 MG/1; MG/1
2 TABLET ORAL ONCE
Qty: 0 | Refills: 0 | Status: DISCONTINUED | OUTPATIENT
Start: 2019-04-19 | End: 2019-04-19

## 2019-04-19 RX ORDER — PROPOFOL 10 MG/ML
90 INJECTION, EMULSION INTRAVENOUS ONCE
Qty: 0 | Refills: 0 | Status: COMPLETED | OUTPATIENT
Start: 2019-04-19 | End: 2019-04-19

## 2019-04-19 RX ORDER — CALCIUM CARBONATE 500(1250)
1 TABLET ORAL DAILY
Qty: 0 | Refills: 0 | Status: DISCONTINUED | OUTPATIENT
Start: 2019-04-19 | End: 2019-04-19

## 2019-04-19 RX ORDER — LATANOPROST 0.05 MG/ML
1 SOLUTION/ DROPS OPHTHALMIC; TOPICAL
Qty: 0 | Refills: 0 | COMMUNITY

## 2019-04-19 RX ORDER — SODIUM CHLORIDE 9 MG/ML
1000 INJECTION, SOLUTION INTRAVENOUS
Qty: 0 | Refills: 0 | Status: DISCONTINUED | OUTPATIENT
Start: 2019-04-19 | End: 2019-04-19

## 2019-04-19 RX ADMIN — AMLODIPINE BESYLATE 2.5 MILLIGRAM(S): 2.5 TABLET ORAL at 22:16

## 2019-04-19 RX ADMIN — DORZOLAMIDE HYDROCHLORIDE 1 DROP(S): 20 SOLUTION/ DROPS OPHTHALMIC at 22:50

## 2019-04-19 RX ADMIN — MORPHINE SULFATE 1 MILLIGRAM(S): 50 CAPSULE, EXTENDED RELEASE ORAL at 21:09

## 2019-04-19 RX ADMIN — MORPHINE SULFATE 4 MILLIGRAM(S): 50 CAPSULE, EXTENDED RELEASE ORAL at 11:49

## 2019-04-19 RX ADMIN — SODIUM CHLORIDE 100 MILLILITER(S): 9 INJECTION, SOLUTION INTRAVENOUS at 17:07

## 2019-04-19 RX ADMIN — CARVEDILOL PHOSPHATE 25 MILLIGRAM(S): 80 CAPSULE, EXTENDED RELEASE ORAL at 19:16

## 2019-04-19 RX ADMIN — MORPHINE SULFATE 4 MILLIGRAM(S): 50 CAPSULE, EXTENDED RELEASE ORAL at 12:50

## 2019-04-19 RX ADMIN — HYDROMORPHONE HYDROCHLORIDE 1 MILLIGRAM(S): 2 INJECTION INTRAMUSCULAR; INTRAVENOUS; SUBCUTANEOUS at 14:28

## 2019-04-19 RX ADMIN — BRIMONIDINE TARTRATE 1 DROP(S): 2 SOLUTION/ DROPS OPHTHALMIC at 22:50

## 2019-04-19 RX ADMIN — BUDESONIDE AND FORMOTEROL FUMARATE DIHYDRATE 2 PUFF(S): 160; 4.5 AEROSOL RESPIRATORY (INHALATION) at 22:50

## 2019-04-19 RX ADMIN — PROPOFOL 90 MILLIGRAM(S): 10 INJECTION, EMULSION INTRAVENOUS at 10:51

## 2019-04-19 RX ADMIN — MORPHINE SULFATE 4 MILLIGRAM(S): 50 CAPSULE, EXTENDED RELEASE ORAL at 10:05

## 2019-04-19 NOTE — ED ADULT NURSE NOTE - PMH
Asthma    COPD (chronic obstructive pulmonary disease)    Glaucoma    Kidney stones    Prostate disorder

## 2019-04-19 NOTE — H&P ADULT - NSHPPHYSICALEXAM_GEN_ALL_CORE
Vital Signs Last 24 Hrs  T(C): 35.6 (19 Apr 2019 09:43), Max: 35.6 (19 Apr 2019 09:43)  T(F): 96 (19 Apr 2019 09:43), Max: 96 (19 Apr 2019 09:43)  HR: 70 (19 Apr 2019 11:45) (60 - 73)  BP: 197/89 (19 Apr 2019 11:45) (172/82 - 216/93)  BP(mean): --  ABP: --  ABP(mean): --  RR: 20 (19 Apr 2019 11:45) (10 - 20)  SpO2: 100% (19 Apr 2019 11:45) (99% - 100%)    PHYSICAL EXAM:  GENERAL: NAD, speaks in full sentences, no signs of respiratory distress  HEAD:  Atraumatic, Normocephalic  EYES: EOMI, PERRLA, non-icteric, no injected sclera  NECK: Supple, No JVD  CHEST/LUNG: Clear to auscultation bilaterally; No wheeze; No crackles; No accessory muscles used  HEART: Regular rate and rhythm; No murmurs;   ABDOMEN: Soft, Nontender, Nondistended; Bowel sounds present; No guarding  EXTREMITIES:  No Le edema - did not move or palpate rt lower ext due to pain, moves all ext  PSYCH: AAOx3  NEUROLOGY: non-focal  SKIN: No rashes or lesions

## 2019-04-19 NOTE — BRIEF OPERATIVE NOTE - NSICDXBRIEFPREOP_GEN_ALL_CORE_FT
PRE-OP DIAGNOSIS:  Failed total hip arthroplasty with dislocation, initial encounter 19-Apr-2019 17:47:49  Parminder Pittman

## 2019-04-19 NOTE — ED PROVIDER NOTE - OBJECTIVE STATEMENT
Pt is an 84 y/o male with hx of COPD, CHF s/p AICD, HTN, DLD, presents to ED for right prosthetic hip dislocation. Right hip pain is moderate, constant, worse with movement. PTA pt bent over at home and felt hip come out. Pt did not fall, no other injury. Pt with one prior dislocation 2 years ago. Prosthesis was placed > 10 years ago.

## 2019-04-19 NOTE — CONSULT NOTE ADULT - SUBJECTIVE AND OBJECTIVE BOX
SUBJECTIVE:    Patient is a 83y old Male who presents with a chief complaint of   Currently admitted to medicine with the primary diagnosis of Dislocation of right hip, initial encounter     Today is hospital day 0.     PAST MEDICAL & SURGICAL HISTORY  Glaucoma  COPD (chronic obstructive pulmonary disease)  Prostate disorder  Kidney stones  Asthma  History of appendectomy  History of right hip replacement  History of implantable cardioverter-defibrillator (ICD) placement    ALLERGIES:  No Known Allergies    MEDICATIONS:  STANDING MEDICATIONS  morphine  - Injectable 4 milliGRAM(s) IV Push once    PRN MEDICATIONS    VITALS:   T(F): 96  HR: 73  BP: 216/93  RR: 20  SpO2: 100%    LABS:                        13.7   8.13  )-----------( 222      ( 19 Apr 2019 11:16 )             42.3     04-19    138  |  108  |  14  ----------------------------<  126<H>  7.1<HH>   |  19  |  1.0    Ca    8.5      19 Apr 2019 11:16    TPro  6.5  /  Alb  3.5  /  TBili  0.2  /  DBili  x   /  AST  32  /  ALT  15  /  AlkPhos  96  04-19          Troponin T, Serum: <0.01 ng/mL (04-19-19 @ 11:16)      CARDIAC MARKERS ( 19 Apr 2019 11:16 )  x     / <0.01 ng/mL / x     / x     / x          RADIOLOGY:    PHYSICAL EXAM:  GEN: No acute distress  LUNGS: Clear to auscultation bilaterally   HEART: S1/S2 present. RRR.   ABD/ GI: Soft, non-tender, non-distended. Bowel sounds present  EXT: NC/NC/NE/2+PP/LINDER  NEURO: AAOX3 SUBJECTIVE:    Patient is a 83y old Male who presents with a chief complaint of   Currently admitted to medicine with the primary diagnosis of Dislocation of right hip, initial encounter     Today is hospital day 0.     PAST MEDICAL & SURGICAL HISTORY  Glaucoma  COPD (chronic obstructive pulmonary disease)  Prostate disorder  Kidney stones  Asthma  History of appendectomy  History of right hip replacement  History of implantable cardioverter-defibrillator (ICD) placement    ALLERGIES:  No Known Allergies    MEDICATIONS:  STANDING MEDICATIONS  morphine  - Injectable 4 milliGRAM(s) IV Push once    PRN MEDICATIONS    VITALS:   T(F): 96  HR: 73  BP: 216/93  RR: 20  SpO2: 100%    LABS:                        13.7   8.13  )-----------( 222      ( 19 Apr 2019 11:16 )             42.3     04-19    138  |  108  |  14  ----------------------------<  126<H>  7.1<HH>   |  19  |  1.0    Ca    8.5      19 Apr 2019 11:16    TPro  6.5  /  Alb  3.5  /  TBili  0.2  /  DBili  x   /  AST  32  /  ALT  15  /  AlkPhos  96  04-19          Troponin T, Serum: <0.01 ng/mL (04-19-19 @ 11:16)      CARDIAC MARKERS ( 19 Apr 2019 11:16 )  x     / <0.01 ng/mL / x     / x     / x          RADIOLOGY:    PHYSICAL EXAM:  GEN: No acute distress  LUNGS: Clear to auscultation bilaterally   HEART: S1/S2 present. RRR.   ABD/ GI: Soft, non-tender, non-distended. Bowel sounds present  EXT: NC/NC/NE/2+PP/LINDER  NEURO: AAOX3    EKG-paced rythm rate 64/min

## 2019-04-19 NOTE — CONSULT NOTE ADULT - SUBJECTIVE AND OBJECTIVE BOX
right Total Hip Arthroplasty dislocation CONSULT  T(C): 35.6 (04-19-19 @ 09:43), Max: 35.6 (04-19-19 @ 09:43)  HR: 60 (04-19-19 @ 11:21) (60 - 73)  BP: 183/84 (04-19-19 @ 11:21) (172/82 - 199/91)  RR: 18 (04-19-19 @ 11:21) (10 - 20)  SpO2: 100% (04-19-19 @ 11:21) (99% - 100%)    ^RT HIP INJURY  No pertinent family history in first degree relatives  MEWS Score  Glaucoma  COPD (chronic obstructive pulmonary disease)  Prostate disorder  Kidney stones  Asthma  History of appendectomy  History of right hip replacement  History of implantable cardioverter-defibrillator (ICD) placement  RT HIP INJURY  90+                No Known Allergies    83y    The ED calls to consult on an acute R Total Hip Arthroplasty dislocation last approx 2 years ago reportedly a difficult reduction at that time.  the pt states  he bent over to pick something up and his hip came out   after conscious sedation with propofol closed reduction using multiple techniques was UNSUCCESSFULLY PERFORMED   For fear of potential periprosthetic fx , a decision to bring the pt to the OR and attempt closed reduction with the aid of the fx table and flouro was made The pts sciatic nerve  fxn remained intact before and after the attempted reduction   medicine has been asked to see the pt urgently for preop risk assessment  maintain npo

## 2019-04-19 NOTE — CONSULT NOTE ADULT - ASSESSMENT
Pt is an 84 y/o male with hx of COPD, CHF s/p AICD, HTN, DLD, presents to ED for right prosthetic hip dislocation. Right hip pain is moderate, constant, worse with movement. PTA pt bent over at home and felt hip come out. Pt did not fall, no other injury. Pt with one prior dislocation 2 years ago. Prosthesis was placed > 10 years ago.    # Rt hip prosthetic dislocation-- patient did not have a fall. Failed closed reduction under propofol.    # Moderate to severe copd-- former smoker-- was admitted in ICU in oct 2018-- for exacerbation.  on po prednisone at home    # Chr systolic CHF s/p AICD-- on carvedilol and losartan.    # Hyperkalemia--specimen was hemolyzed and patient is on losartan.    # uncontrolled BP Pt is an 82 y/o male with hx of COPD, CHF s/p AICD, HTN, DLD, presents to ED for right prosthetic hip dislocation. Right hip pain is moderate, constant, worse with movement. PTA pt bent over at home and felt hip come out. Pt did not fall, no other injury. Pt with one prior dislocation 2 years ago. Prosthesis was placed > 10 years ago.    # Rt hip prosthetic dislocation-- patient did not have a fall. Failed closed reduction under propofol.    # Moderate to severe copd-- former smoker-- was admitted in ICU in oct 2018-- for exacerbation.    # hx of pemphigus-on po prednisone at home for it.    # Chr systolic CHF s/p AICD-- on carvedilol and losartan. Family does not know recent reports of echo.   He saw cardiologist several yrs ago and is waiting to see one soon.    # hx of CAD-- s/p PCI 10yrs ago-- on and off on aspirin.  Can walk 2-3 blocks before getting SOB      # Hyperkalemia--specimen was hemolyzed and patient is on losartan. Lytes done but I cannot see results    # uncontrolled BP-- likely due to pain-- given one dose of dilaudid.    Moderate to high risk for general anesthesia considering has not seen cardiologist in several years.

## 2019-04-19 NOTE — ED ADULT TRIAGE NOTE - CHIEF COMPLAINT QUOTE
Right hip pain from today while trying to bend down, as per patient is dislocated, Right leg noted crossed over to left side, denies numbness and tingling, is able to move toes, palpible pedal pulse, received 100mcg of fentanyl in field

## 2019-04-19 NOTE — ED PROVIDER NOTE - PROGRESS NOTE DETAILS
Reduction attempted by ED team and ortho Otto JARA, without success. Will admit for reduction in OR. PODLOG: Labs and pre-op testing ordered. Discussed with Dr. Noe, hospitalist, for expedited clearance.

## 2019-04-19 NOTE — ED PROVIDER NOTE - PHYSICAL EXAMINATION
Constitutional: Well developed, well nourished. NAD.  Head: Normocephalic, atraumatic.  Eyes: PERRL. EOMI.  ENT: No nasal discharge. Mucous membranes moist.  Neck: Supple. Painless ROM.  Cardiovascular: Normal S1, S2. Regular rate and rhythm. No murmurs, rubs, or gallops.  Pulmonary: Normal respiratory rate and effort. Lungs clear to auscultation bilaterally. No wheezing, rales, or rhonchi.  Abdominal: Soft. Nondistended. Nontender. No rebound, guarding, rigidity.  Extremities. Pelvis stable.+ right hip flexed, internally rotated. NVI distally.  Skin: No rashes, cyanosis.  Neuro: AAOx3. No focal neurological deficits.  Psych: Normal mood. Normal affect.

## 2019-04-19 NOTE — ED PROVIDER NOTE - ATTENDING CONTRIBUTION TO CARE
83 y.o. male, PMH of asthma, COPD, Cardiac Disease with AICD, right hip replacement, kidney stones, BIBA with right hip pain after he bent down and felt a pop. H/o prior dislocations in the past. No other complains. On exam, pt in NAD, AAOx3, head NC/AT, CN II-XII intact, lungs CTA B/L, CV S1S2 regular, abdomen soft/NT/ND/(+)BS, pelvis stable, pain to right hip, hip in flexion, pulses intact, sensation intact, ext (-) edema. Will do XR, reevaluate.

## 2019-04-19 NOTE — CHART NOTE - NSCHARTNOTEFT_GEN_A_CORE
PACU ANESTHESIA ADMISSION NOTE      Procedure:   Post op diagnosis:      ____  Intubated  TV:______       Rate: ______      FiO2: ______    __x__  Patent Airway    __x__  Full return of protective reflexes    __x__  Full recovery from anesthesia / back to baseline     Vitals:   T:    98..1       R:   18               BP:      176/86            Sat:   100%                P: 88      Mental Status:  __x__ Awake   _____ Alert   _____ Drowsy   _____ Sedated    Nausea/Vomiting:  _x___ NO  ______Yes,   See Post - Op Orders          Pain Scale (0-10):  _____    Treatment: _x___ None    ____ See Post - Op/PCA Orders    Post - Operative Fluids:   ____ Oral   _x___ See Post - Op Orders    Plan: Discharge:   ____Home       __x__Floor     _____Critical Care    _____  Other:_________________    Comments:

## 2019-04-19 NOTE — BRIEF OPERATIVE NOTE - NSICDXBRIEFPOSTOP_GEN_ALL_CORE_FT
POST-OP DIAGNOSIS:  Failed total hip arthroplasty with dislocation 19-Apr-2019 17:48:18  Parminder Pittman

## 2019-04-19 NOTE — H&P ADULT - NSICDXPASTMEDICALHX_GEN_ALL_CORE_FT
PAST MEDICAL HISTORY:  Asthma     COPD (chronic obstructive pulmonary disease)     Glaucoma     Kidney stones     Prostate disorder PAST MEDICAL HISTORY:  Asthma     CAD (coronary artery disease) pci x2 ~2008    COPD (chronic obstructive pulmonary disease)     Glaucoma     Kidney stones     Prostate disorder

## 2019-04-19 NOTE — H&P ADULT - NSHPREVIEWOFSYSTEMS_GEN_ALL_CORE
REVIEW OF SYSTEMS:    CONSTITUTIONAL: No weakness or fevers  EYES/ENT: No visual changes  NECK: No pain or stiffness  RESPIRATORY: No cough, wheezing, hemoptysis; No shortness of breath  CARDIOVASCULAR: No chest pain or palpitations, no lower extremity edema, left chest wall aicd  GASTROINTESTINAL: No abdominal pain. No nausea or vomiting; No diarrhea or constipation. No bloody or black colored stool  GENITOURINARY: No pain with urination, no increased urinary frequency, no dark o r bloody urine  NEUROLOGICAL: No numbness or weakness  SKIN: No itching, rashes

## 2019-04-19 NOTE — H&P ADULT - NSICDXPASTSURGICALHX_GEN_ALL_CORE_FT
PAST SURGICAL HISTORY:  History of appendectomy     History of implantable cardioverter-defibrillator (ICD) placement     History of right hip replacement PAST SURGICAL HISTORY:  History of appendectomy     History of implantable cardioverter-defibrillator (ICD) placement [facility]    History of right hip replacement

## 2019-04-19 NOTE — BRIEF OPERATIVE NOTE - NSICDXBRIEFPROCEDURE_GEN_ALL_CORE_FT
PROCEDURES:  Closed treatment of posterior dislocation of hip with anesthesia 19-Apr-2019 17:47:22  Parminder Pittman

## 2019-04-19 NOTE — PROGRESS NOTE ADULT - SUBJECTIVE AND OBJECTIVE BOX
ORTHO POC    Patient seen and examined after his surgery. Resting comfortably, pain controlled. Denies fevers, chills, SOB.    PE :  RLE :   Knee Immobilizer and Abduction Pillow in place  Thigh and calf soft and compressible  EHL TA GS motor intact  SILT distally  Foot warm and perfused    A/P  83M with right periprosthetic hip dislocation s/p closed reduction:  - No further intervention at this time  - Pain control  - DVT PPX  - IS  - WBAT RLE  - Maintain Knee Immobilizer while ambulating and abduction pillow in bed  - Posterior hip precautions emphasized with patient  - PT OT OOBTC  - Home meds  - Medical management per primary team  - Dispo: pending  - Can be discharged with outpatient follow-up with primary surgeon or Dr. Shira Patrick, Call  to make appointment in 1-2 weeks

## 2019-04-19 NOTE — H&P ADULT - HISTORY OF PRESENT ILLNESS
83/M hx from home, of CAD s/p stent x2 (~2009), heart failure s/p AICD (no EF available), glaucoma to right eye, right hip prosthesis (2001), recent [facility] admission 10/2018 (resp infection / COPD), presents to ED for right hip pain.    Pt states he was at home, feeling well, he went to lift dishes from under the cabinet and suddenly had right hip pain. Family called EMS. While in ED reduction was attempted and failed x2.     He denies chest pain, sob, lower ext swelling, fever, cough.

## 2019-04-19 NOTE — ED PROCEDURE NOTE - NS_POSTPROCCAREGUIDE_ED_ALL_ED
Patient is now fully awake, with vital signs and temperature stable, hydration is adequate, patients Alvaro’s  score is at baseline (or greater than 8), patient and escort has received  discharge education.

## 2019-04-19 NOTE — PRE-ANESTHESIA EVALUATION ADULT - NSANTHADDINFOFT_GEN_ALL_CORE
K repeated in ED 4.3  Cardiology office called MD not available spoke with PA who states they have no followup recently with this patient, laste reports from 2013 when LV lead replaced and battery changed. In 2016 had ef 50-55% AICD for ischemic cardiomyopathy and PCI at that time as well.  R/b/A explained to family and patient. All questions answered.  Plan GETA

## 2019-04-19 NOTE — H&P ADULT - ASSESSMENT
# Right femoral neck hip arthroplasty dislocation - unsuccessful closed reduction  Ortho following - plans to take to OR for reduction  Type and screen    # Hx of Heart failure - no EF available - pt is s/p AICD  CXR shows no effusions or opacities    # Hyperkalemia - grossly hemolyzed, repeat stat    # Asthma / COPD     # Glaucoma     # Prostate disorder       Bedrest  CHG  NPO  DVT ppx 83/M hx from home, of CAD s/p stent x2 (~2009), heart failure s/p AICD (no EF available), glaucoma to right eye, right hip prosthesis (2001), recent [facility] admission 10/2018 (resp infection / COPD), presents to ED for right hip pain.    # Right femoral neck hip arthroplasty dislocation - unsuccessful closed reduction  Ortho following - plans to take to OR for reduction  Type and screen  PRN analgesics    # Hx of Heart failure - no EF available - pt is s/p AICD  CXR shows no effusions or opacities  Denies hx of device firing, cardiac arrest, hx of leg swelling, or use of lasix/furosemide  Able to ambulate 2-3 blocks before getting SOB  Tele for now  EP for interrogation ([employer])    # CAD s/p PCI x2 stents ~2008  Pt not on aspirin - start now  C/w statin  C/w Carvedilol    # HTN - c/w losartan + carvedilol    # Hyperkalemia - grossly hemolyzed, repeat stat    # Asthma / COPD - pt denies hx of smoking or having COPD - denies use of inhalers    # Glaucoma - c/w drops    # BPH - c/w flomax      Bedrest  CHG  NPO  DVT ppx    Pt counceled on medication compliance and importance for follow up with is PMD + Cardiologist. 83/M hx from home, of CAD s/p stent x2 (~2009), heart failure s/p AICD (no EF available), glaucoma to right eye, right hip prosthesis (2001), recent [facility] admission 10/2018 (resp infection / COPD), presents to ED for right hip pain. Found t have right femoral neck hip arthroplasty dislocation - unsuccessful closed reduction in ED. Admit for closed reduction in OR today.    # Right femoral neck hip arthroplasty dislocation - unsuccessful closed reduction  Ortho following - plans to take to OR for reduction  Type and screen  PRN analgesics    # Hx of Heart failure - no EF available - pt is s/p AICD  Tele x24 hr - d/w attending  CXR shows no effusions or opacities  Denies hx of device firing, cardiac arrest, hx of leg swelling, or use of lasix/furosemide or any diuretic/water pill  Able to ambulate 2-3 blocks before getting SOB  EP for interrogation ([employer])    # CAD s/p PCI x2 stents ~2008  Pt not on aspirin - start now  C/w statin  C/w Carvedilol    # HTN - c/w losartan + carvedilol    # Hx of Pemphigus - pt is on daily prednisone at 10mg since 1995; continue current dose, start Vit D + Calcium, check Vit D level - risk for osteoporsis in view of prolonged steroid use    # Hyperkalemia - grossly hemolyzed, repeat stat    # Asthma / COPD - pt denies hx of smoking or having COPD - denies use of inhalers; c/w symbicort + montelukast for now.  - no wheezing now    # Glaucoma - c/w drops    # BPH - c/w flomax    Bedrest  CHG  NPO  DVT ppx    Dispo - tele x 24hr, if able to ambulate d/c 4/20  Pt counceled on medication compliance and importance for follow up with is PMD + Cardiologist.

## 2019-04-19 NOTE — H&P ADULT - ATTENDING COMMENTS
Patient seen and examined independently. Agree with resident note  please see consult note on the same day-- details in it

## 2019-04-20 LAB
ANION GAP SERPL CALC-SCNC: 12 MMOL/L — SIGNIFICANT CHANGE UP (ref 7–14)
BASOPHILS # BLD AUTO: 0.01 K/UL — SIGNIFICANT CHANGE UP (ref 0–0.2)
BASOPHILS NFR BLD AUTO: 0.1 % — SIGNIFICANT CHANGE UP (ref 0–1)
BUN SERPL-MCNC: 20 MG/DL — SIGNIFICANT CHANGE UP (ref 10–20)
CALCIUM SERPL-MCNC: 8.8 MG/DL — SIGNIFICANT CHANGE UP (ref 8.5–10.1)
CHLORIDE SERPL-SCNC: 104 MMOL/L — SIGNIFICANT CHANGE UP (ref 98–110)
CO2 SERPL-SCNC: 22 MMOL/L — SIGNIFICANT CHANGE UP (ref 17–32)
CREAT SERPL-MCNC: 1.1 MG/DL — SIGNIFICANT CHANGE UP (ref 0.7–1.5)
EOSINOPHIL # BLD AUTO: 0 K/UL — SIGNIFICANT CHANGE UP (ref 0–0.7)
EOSINOPHIL NFR BLD AUTO: 0 % — SIGNIFICANT CHANGE UP (ref 0–8)
GLUCOSE SERPL-MCNC: 153 MG/DL — HIGH (ref 70–99)
HCT VFR BLD CALC: 40 % — LOW (ref 42–52)
HGB BLD-MCNC: 13.3 G/DL — LOW (ref 14–18)
IMM GRANULOCYTES NFR BLD AUTO: 0.5 % — HIGH (ref 0.1–0.3)
LYMPHOCYTES # BLD AUTO: 1.15 K/UL — LOW (ref 1.2–3.4)
LYMPHOCYTES # BLD AUTO: 9.1 % — LOW (ref 20.5–51.1)
MCHC RBC-ENTMCNC: 30.4 PG — SIGNIFICANT CHANGE UP (ref 27–31)
MCHC RBC-ENTMCNC: 33.3 G/DL — SIGNIFICANT CHANGE UP (ref 32–37)
MCV RBC AUTO: 91.3 FL — SIGNIFICANT CHANGE UP (ref 80–94)
MONOCYTES # BLD AUTO: 0.9 K/UL — HIGH (ref 0.1–0.6)
MONOCYTES NFR BLD AUTO: 7.2 % — SIGNIFICANT CHANGE UP (ref 1.7–9.3)
NEUTROPHILS # BLD AUTO: 10.46 K/UL — HIGH (ref 1.4–6.5)
NEUTROPHILS NFR BLD AUTO: 83.1 % — HIGH (ref 42.2–75.2)
NRBC # BLD: 0 /100 WBCS — SIGNIFICANT CHANGE UP (ref 0–0)
PLATELET # BLD AUTO: 222 K/UL — SIGNIFICANT CHANGE UP (ref 130–400)
POTASSIUM SERPL-MCNC: 4.4 MMOL/L — SIGNIFICANT CHANGE UP (ref 3.5–5)
POTASSIUM SERPL-SCNC: 4.4 MMOL/L — SIGNIFICANT CHANGE UP (ref 3.5–5)
RBC # BLD: 4.38 M/UL — LOW (ref 4.7–6.1)
RBC # FLD: 13.2 % — SIGNIFICANT CHANGE UP (ref 11.5–14.5)
SODIUM SERPL-SCNC: 138 MMOL/L — SIGNIFICANT CHANGE UP (ref 135–146)
WBC # BLD: 12.58 K/UL — HIGH (ref 4.8–10.8)
WBC # FLD AUTO: 12.58 K/UL — HIGH (ref 4.8–10.8)

## 2019-04-20 PROCEDURE — 72070 X-RAY EXAM THORAC SPINE 2VWS: CPT | Mod: 26

## 2019-04-20 PROCEDURE — 72100 X-RAY EXAM L-S SPINE 2/3 VWS: CPT | Mod: 26

## 2019-04-20 RX ORDER — LATANOPROST 0.05 MG/ML
1 SOLUTION/ DROPS OPHTHALMIC; TOPICAL AT BEDTIME
Qty: 0 | Refills: 0 | Status: DISCONTINUED | OUTPATIENT
Start: 2019-04-20 | End: 2019-04-22

## 2019-04-20 RX ORDER — LOSARTAN POTASSIUM 100 MG/1
100 TABLET, FILM COATED ORAL DAILY
Qty: 0 | Refills: 0 | Status: DISCONTINUED | OUTPATIENT
Start: 2019-04-20 | End: 2019-04-20

## 2019-04-20 RX ORDER — LOSARTAN POTASSIUM 100 MG/1
100 TABLET, FILM COATED ORAL DAILY
Qty: 0 | Refills: 0 | Status: DISCONTINUED | OUTPATIENT
Start: 2019-04-20 | End: 2019-04-22

## 2019-04-20 RX ORDER — ATORVASTATIN CALCIUM 80 MG/1
10 TABLET, FILM COATED ORAL AT BEDTIME
Qty: 0 | Refills: 0 | Status: DISCONTINUED | OUTPATIENT
Start: 2019-04-20 | End: 2019-04-22

## 2019-04-20 RX ORDER — CALCIUM CARBONATE 500(1250)
1 TABLET ORAL DAILY
Qty: 0 | Refills: 0 | Status: DISCONTINUED | OUTPATIENT
Start: 2019-04-20 | End: 2019-04-22

## 2019-04-20 RX ORDER — MONTELUKAST 4 MG/1
10 TABLET, CHEWABLE ORAL DAILY
Qty: 0 | Refills: 0 | Status: DISCONTINUED | OUTPATIENT
Start: 2019-04-20 | End: 2019-04-22

## 2019-04-20 RX ORDER — CHOLECALCIFEROL (VITAMIN D3) 125 MCG
1000 CAPSULE ORAL DAILY
Qty: 0 | Refills: 0 | Status: DISCONTINUED | OUTPATIENT
Start: 2019-04-20 | End: 2019-04-22

## 2019-04-20 RX ORDER — ASPIRIN/CALCIUM CARB/MAGNESIUM 324 MG
81 TABLET ORAL DAILY
Qty: 0 | Refills: 0 | Status: DISCONTINUED | OUTPATIENT
Start: 2019-04-20 | End: 2019-04-22

## 2019-04-20 RX ORDER — TAMSULOSIN HYDROCHLORIDE 0.4 MG/1
0.4 CAPSULE ORAL AT BEDTIME
Qty: 0 | Refills: 0 | Status: DISCONTINUED | OUTPATIENT
Start: 2019-04-20 | End: 2019-04-22

## 2019-04-20 RX ADMIN — DORZOLAMIDE HYDROCHLORIDE 1 DROP(S): 20 SOLUTION/ DROPS OPHTHALMIC at 22:04

## 2019-04-20 RX ADMIN — BRIMONIDINE TARTRATE 1 DROP(S): 2 SOLUTION/ DROPS OPHTHALMIC at 05:04

## 2019-04-20 RX ADMIN — BUDESONIDE AND FORMOTEROL FUMARATE DIHYDRATE 2 PUFF(S): 160; 4.5 AEROSOL RESPIRATORY (INHALATION) at 08:36

## 2019-04-20 RX ADMIN — ATORVASTATIN CALCIUM 10 MILLIGRAM(S): 80 TABLET, FILM COATED ORAL at 22:04

## 2019-04-20 RX ADMIN — Medication 1000 UNIT(S): at 11:13

## 2019-04-20 RX ADMIN — DORZOLAMIDE HYDROCHLORIDE 1 DROP(S): 20 SOLUTION/ DROPS OPHTHALMIC at 17:30

## 2019-04-20 RX ADMIN — Medication 1 TABLET(S): at 11:13

## 2019-04-20 RX ADMIN — LOSARTAN POTASSIUM 100 MILLIGRAM(S): 100 TABLET, FILM COATED ORAL at 08:35

## 2019-04-20 RX ADMIN — Medication 81 MILLIGRAM(S): at 11:13

## 2019-04-20 RX ADMIN — TAMSULOSIN HYDROCHLORIDE 0.4 MILLIGRAM(S): 0.4 CAPSULE ORAL at 22:04

## 2019-04-20 RX ADMIN — MONTELUKAST 10 MILLIGRAM(S): 4 TABLET, CHEWABLE ORAL at 22:04

## 2019-04-20 RX ADMIN — DORZOLAMIDE HYDROCHLORIDE 1 DROP(S): 20 SOLUTION/ DROPS OPHTHALMIC at 05:04

## 2019-04-20 RX ADMIN — CARVEDILOL PHOSPHATE 25 MILLIGRAM(S): 80 CAPSULE, EXTENDED RELEASE ORAL at 17:30

## 2019-04-20 RX ADMIN — CARVEDILOL PHOSPHATE 25 MILLIGRAM(S): 80 CAPSULE, EXTENDED RELEASE ORAL at 05:04

## 2019-04-20 RX ADMIN — BUDESONIDE AND FORMOTEROL FUMARATE DIHYDRATE 2 PUFF(S): 160; 4.5 AEROSOL RESPIRATORY (INHALATION) at 20:23

## 2019-04-20 RX ADMIN — MORPHINE SULFATE 1 MILLIGRAM(S): 50 CAPSULE, EXTENDED RELEASE ORAL at 00:50

## 2019-04-20 RX ADMIN — BRIMONIDINE TARTRATE 1 DROP(S): 2 SOLUTION/ DROPS OPHTHALMIC at 17:30

## 2019-04-20 RX ADMIN — BRIMONIDINE TARTRATE 1 DROP(S): 2 SOLUTION/ DROPS OPHTHALMIC at 22:04

## 2019-04-20 NOTE — PROGRESS NOTE ADULT - SUBJECTIVE AND OBJECTIVE BOX
ORTHO PROGRESS NOTE    Patient seen and examined this AM. S/P REDUCTION R NICO DISLOCATION. Doing well. No new onset of pain, fall or dislocation. Denies fevers or chills, chest or calf pain.    PE :  RLE :   Knee Immobilizer and Abduction Pillow in place  Thigh and calf soft and compressible  EHL TA GS motor intact  SILT distally  Foot warm and perfused    A/P  83M with right periprosthetic hip dislocation s/p closed reduction:  - To facilitate outpatient care. Recommend lateral view of the entire thoracolumbar spine including the femoral heads. Order is in. Will review as an outpatient. No need to stay an inpatient for it.   - Pain control  - DVT PPX  - IS  - WBAT RLE  - Maintain Knee Immobilizer while ambulating and abduction pillow in bed  - Posterior hip precautions emphasized with patient  - PT OT OOBTC  - Home meds  - Medical management per primary team  - Dispo: pending  - Can be discharged with outpatient follow-up with primary surgeon or Dr. Shira Patrick, Call  to make appointment in 1-2 weeks

## 2019-04-20 NOTE — PROGRESS NOTE ADULT - SUBJECTIVE AND OBJECTIVE BOX
SUBJECTIVE:    Patient is a 83y old Male who presents with a chief complaint of right hip prosthesis dislocation (20 Apr 2019 12:05)    Currently admitted to medicine with the primary diagnosis of Dislocation of right hip, initial encounter     Today is hospital day 1d. This morning he is resting comfortably in bed and reports no new issues or overnight events.     PAST MEDICAL & SURGICAL HISTORY  CAD (coronary artery disease): pci x2 ~2008  Glaucoma  COPD (chronic obstructive pulmonary disease)  Prostate disorder  Kidney stones  Asthma  History of appendectomy  History of right hip replacement  History of implantable cardioverter-defibrillator (ICD) placement: [employer]    SOCIAL HISTORY:  Negative for smoking/alcohol/drug use.     ALLERGIES:  No Known Allergies    MEDICATIONS:  STANDING MEDICATIONS  aspirin  chewable 81 milliGRAM(s) Oral daily  atorvastatin 10 milliGRAM(s) Oral at bedtime  brimonidine 0.2% Ophthalmic Solution 1 Drop(s) Right EYE three times a day  buDESOnide 160 MICROgram(s)/formoterol 4.5 MICROgram(s) Inhaler 2 Puff(s) Inhalation two times a day  calcium carbonate   1250 mG (OsCal) 1 Tablet(s) Oral daily  carvedilol 25 milliGRAM(s) Oral every 12 hours  cholecalciferol 1000 Unit(s) Oral daily  dorzolamide 2% Ophthalmic Solution 1 Drop(s) Right EYE three times a day  latanoprost 0.005% Ophthalmic Solution 1 Drop(s) Right EYE at bedtime  losartan 100 milliGRAM(s) Oral daily  montelukast 10 milliGRAM(s) Oral daily  predniSONE   Tablet 10 milliGRAM(s) Oral daily  tamsulosin 0.4 milliGRAM(s) Oral at bedtime    PRN MEDICATIONS    VITALS:   T(F): 97.6  HR: 81  BP: 164/77  RR: 18  SpO2: 95%    LABS:                        13.3   12.58 )-----------( 222      ( 20 Apr 2019 12:26 )             40.0     04-20    138  |  104  |  20  ----------------------------<  153<H>  4.4   |  22  |  1.1    Ca    8.8      20 Apr 2019 12:26    TPro  6.5  /  Alb  3.5  /  TBili  0.2  /  DBili  x   /  AST  32  /  ALT  15  /  AlkPhos  96  04-19    PT/INR - ( 19 Apr 2019 11:16 )   PT: 10.50 sec;   INR: 0.91 ratio         PTT - ( 19 Apr 2019 11:16 )  PTT:29.6 sec          CARDIAC MARKERS ( 19 Apr 2019 11:16 )  x     / <0.01 ng/mL / x     / x     / x          RADIOLOGY:  < from: Xray Hip 2-3 Views, Right (04.19.19 @ 12:08) >  Findings/  impression:    Persistent superior dislocation of femoral component right hip   arthroplasty.    < end of copied text >    < from: Xray Femur 2 Views, Right (04.19.19 @ 12:00) >  FINDINGS/  IMPRESSION:    Superior dislocation of femoral component right hip arthroplasty.    < end of copied text >    PHYSICAL EXAM:  GEN: No acute distress  LUNGS: Clear to auscultation bilaterally   HEART: S1/S2 present. RRR.   ABD: Soft, non-tender, non-distended. Bowel sounds present  EXT: NC/NC/NE/2+PP/LINDER  NEURO: AAOX3

## 2019-04-20 NOTE — PROGRESS NOTE ADULT - ASSESSMENT
Pt is an 82 y/o male with hx of COPD, CHF s/p AICD, HTN, DLD, presents to ED for right prosthetic hip dislocation. Right hip pain is moderate, constant, worse with movement. PTA pt bent over at home and felt hip come out. Pt did not fall, no other injury. Pt with one prior dislocation 2 years ago. Prosthesis was placed > 10 years ago.    # Rt hip prosthetic dislocation-- patient did not have a fall. Failed closed reduction under propofol. now s/pclosed reduction in OR under general anesthesia.    # Moderate to severe copd-- former smoker-- was admitted in ICU in oct 2018-- for exacerbation and is now stable.    # hx of pemphigus-on po prednisone at home for it.    # Chr systolic CHF s/p AICD-- on carvedilol and losartan. Family does not know recent reports of echo.   He saw cardiologist several yrs ago and is waiting to see one soon.    # hx of CAD-- s/p PCI 10yrs ago-- on and off on aspirin.  Can walk 2-3 blocks before getting SOB      # Hyperkalemia--specimen was hemolyzed and patient is on losartan. Lytes done but I cannot see results    # uncontrolled BP-- likely due to pain-- restarted on home meds maximum doses of carvedilol and losartan     orthopedics has said  WBAT as tolerated   Maintain Knee Immobilizer while ambulating and abduction pillow in bed  DC plan when is out of bed and ambulating.  PT gume

## 2019-04-20 NOTE — PROGRESS NOTE ADULT - ASSESSMENT
Pt is an 82 y/o male with hx of COPD, CHF s/p AICD, HTN, DLD, presents to ED for right prosthetic hip dislocation. Right hip pain is moderate, constant, worse with movement. PTA pt bent over at home and felt hip come out. Pt did not fall, no other injury. Pt with one prior dislocation 2 years ago. Prosthesis was placed > 10 years ago.    # right hip dislocation  - no h/o fall  - s/p closed reduction in OR under general anesthesia.  - f/u repeat X ray of femur and back   - f/u ortho  -Pain control as needed.   - WBAT RLE  - Maintain Knee Immobilizer while ambulating and abduction pillow in bed  - Posterior hip precautions as per ortho  - PT OT OOBTC  - Can be discharged with outpatient follow-up with primary surgeon or Dr. Shira Patrick, Call  to make appointment in 1-2 weeks    # Hx of Heart failure - pt is s/p AICD- stable.   c/w tele  - S/p AICD interrogation by EP.   -CXR shows no effusions or opacities  - pt supposed to follow up with his cardiologist soon      # CAD s/p PCI x2 stents ~2008  started on aspirin  C/w statin  C/w Carvedilol    # HTN - c/w losartan + carvedilol    # Hx of Pemphigus   - pt is on daily prednisone at 10mg since 1995; continue current dose  - start Vit D + Calcium  - check Vit D level - risk for osteoporsis in view of prolonged steroid use      # Asthma / COPD - pt denies hx of smoking or having COPD - denies use of inhalers; c/w symbicort + montelukast for now.  - no wheezing now    # Glaucoma - c/w drops    # BPH - c/w flomax    Bedrest  CHG  NPO  DVT ppx

## 2019-04-20 NOTE — PROGRESS NOTE ADULT - SUBJECTIVE AND OBJECTIVE BOX
SUBJECTIVE:    Patient is a 83y old Male who presents with a chief complaint of right hip prosthesis dislocation (20 Apr 2019 10:02)    Currently admitted to medicine with the primary diagnosis of Dislocation of right hip, initial encounter     Today is hospital day 1d.     PAST MEDICAL & SURGICAL HISTORY  CAD (coronary artery disease): pci x2 ~2008  Glaucoma  COPD (chronic obstructive pulmonary disease)  Prostate disorder  Kidney stones  Asthma  History of appendectomy  History of right hip replacement  History of implantable cardioverter-defibrillator (ICD) placement: [employer]    ALLERGIES:  No Known Allergies    MEDICATIONS:  STANDING MEDICATIONS  aspirin  chewable 81 milliGRAM(s) Oral daily  atorvastatin 10 milliGRAM(s) Oral at bedtime  brimonidine 0.2% Ophthalmic Solution 1 Drop(s) Right EYE three times a day  buDESOnide 160 MICROgram(s)/formoterol 4.5 MICROgram(s) Inhaler 2 Puff(s) Inhalation two times a day  calcium carbonate   1250 mG (OsCal) 1 Tablet(s) Oral daily  carvedilol 25 milliGRAM(s) Oral every 12 hours  cholecalciferol 1000 Unit(s) Oral daily  dorzolamide 2% Ophthalmic Solution 1 Drop(s) Right EYE three times a day  latanoprost 0.005% Ophthalmic Solution 1 Drop(s) Right EYE at bedtime  losartan 100 milliGRAM(s) Oral daily  montelukast 10 milliGRAM(s) Oral daily  predniSONE   Tablet 10 milliGRAM(s) Oral daily  tamsulosin 0.4 milliGRAM(s) Oral at bedtime    PRN MEDICATIONS    VITALS:   T(F): 97.6  HR: 81  BP: 164/77  RR: 18  SpO2: 95%    LABS:                        13.7   8.13  )-----------( 222      ( 19 Apr 2019 11:16 )             42.3     04-19    138  |  108  |  14  ----------------------------<  126<H>  7.1<HH>   |  19  |  1.0    Ca    8.5      19 Apr 2019 11:16    TPro  6.5  /  Alb  3.5  /  TBili  0.2  /  DBili  x   /  AST  32  /  ALT  15  /  AlkPhos  96  04-19    PT/INR - ( 19 Apr 2019 11:16 )   PT: 10.50 sec;   INR: 0.91 ratio         PTT - ( 19 Apr 2019 11:16 )  PTT:29.6 sec          CARDIAC MARKERS ( 19 Apr 2019 11:16 )  x     / <0.01 ng/mL / x     / x     / x          RADIOLOGY:    PHYSICAL EXAM:  GEN: No acute distress  LUNGS: Clear to auscultation bilaterally   HEART: S1/S2 present. RRR.   ABD/ GI: Soft, non-tender, non-distended. Bowel sounds present  EXT: NC/NC/NE/2+PP/LINDER  NEURO: AAOX3

## 2019-04-21 LAB
ALBUMIN SERPL ELPH-MCNC: 3.6 G/DL — SIGNIFICANT CHANGE UP (ref 3.5–5.2)
ALP SERPL-CCNC: 90 U/L — SIGNIFICANT CHANGE UP (ref 30–115)
ALT FLD-CCNC: 16 U/L — SIGNIFICANT CHANGE UP (ref 0–41)
ANION GAP SERPL CALC-SCNC: 9 MMOL/L — SIGNIFICANT CHANGE UP (ref 7–14)
AST SERPL-CCNC: 18 U/L — SIGNIFICANT CHANGE UP (ref 0–41)
BASOPHILS # BLD AUTO: 0.01 K/UL — SIGNIFICANT CHANGE UP (ref 0–0.2)
BASOPHILS NFR BLD AUTO: 0.1 % — SIGNIFICANT CHANGE UP (ref 0–1)
BILIRUB SERPL-MCNC: 0.5 MG/DL — SIGNIFICANT CHANGE UP (ref 0.2–1.2)
BUN SERPL-MCNC: 22 MG/DL — HIGH (ref 10–20)
CALCIUM SERPL-MCNC: 8.5 MG/DL — SIGNIFICANT CHANGE UP (ref 8.5–10.1)
CHLORIDE SERPL-SCNC: 108 MMOL/L — SIGNIFICANT CHANGE UP (ref 98–110)
CO2 SERPL-SCNC: 26 MMOL/L — SIGNIFICANT CHANGE UP (ref 17–32)
CREAT SERPL-MCNC: 1 MG/DL — SIGNIFICANT CHANGE UP (ref 0.7–1.5)
EOSINOPHIL # BLD AUTO: 0.01 K/UL — SIGNIFICANT CHANGE UP (ref 0–0.7)
EOSINOPHIL NFR BLD AUTO: 0.1 % — SIGNIFICANT CHANGE UP (ref 0–8)
GLUCOSE SERPL-MCNC: 113 MG/DL — HIGH (ref 70–99)
HCT VFR BLD CALC: 38.8 % — LOW (ref 42–52)
HGB BLD-MCNC: 13 G/DL — LOW (ref 14–18)
IMM GRANULOCYTES NFR BLD AUTO: 0.2 % — SIGNIFICANT CHANGE UP (ref 0.1–0.3)
LYMPHOCYTES # BLD AUTO: 2.41 K/UL — SIGNIFICANT CHANGE UP (ref 1.2–3.4)
LYMPHOCYTES # BLD AUTO: 23.2 % — SIGNIFICANT CHANGE UP (ref 20.5–51.1)
MCHC RBC-ENTMCNC: 30.7 PG — SIGNIFICANT CHANGE UP (ref 27–31)
MCHC RBC-ENTMCNC: 33.5 G/DL — SIGNIFICANT CHANGE UP (ref 32–37)
MCV RBC AUTO: 91.5 FL — SIGNIFICANT CHANGE UP (ref 80–94)
MONOCYTES # BLD AUTO: 1.02 K/UL — HIGH (ref 0.1–0.6)
MONOCYTES NFR BLD AUTO: 9.8 % — HIGH (ref 1.7–9.3)
NEUTROPHILS # BLD AUTO: 6.94 K/UL — HIGH (ref 1.4–6.5)
NEUTROPHILS NFR BLD AUTO: 66.6 % — SIGNIFICANT CHANGE UP (ref 42.2–75.2)
NRBC # BLD: 0 /100 WBCS — SIGNIFICANT CHANGE UP (ref 0–0)
PLATELET # BLD AUTO: 201 K/UL — SIGNIFICANT CHANGE UP (ref 130–400)
POTASSIUM SERPL-MCNC: 4.3 MMOL/L — SIGNIFICANT CHANGE UP (ref 3.5–5)
POTASSIUM SERPL-SCNC: 4.3 MMOL/L — SIGNIFICANT CHANGE UP (ref 3.5–5)
PROT SERPL-MCNC: 6.2 G/DL — SIGNIFICANT CHANGE UP (ref 6–8)
RBC # BLD: 4.24 M/UL — LOW (ref 4.7–6.1)
RBC # FLD: 13.4 % — SIGNIFICANT CHANGE UP (ref 11.5–14.5)
SODIUM SERPL-SCNC: 143 MMOL/L — SIGNIFICANT CHANGE UP (ref 135–146)
WBC # BLD: 10.41 K/UL — SIGNIFICANT CHANGE UP (ref 4.8–10.8)
WBC # FLD AUTO: 10.41 K/UL — SIGNIFICANT CHANGE UP (ref 4.8–10.8)

## 2019-04-21 RX ORDER — ENOXAPARIN SODIUM 100 MG/ML
40 INJECTION SUBCUTANEOUS DAILY
Qty: 0 | Refills: 0 | Status: DISCONTINUED | OUTPATIENT
Start: 2019-04-21 | End: 2019-04-22

## 2019-04-21 RX ADMIN — BRIMONIDINE TARTRATE 1 DROP(S): 2 SOLUTION/ DROPS OPHTHALMIC at 14:10

## 2019-04-21 RX ADMIN — Medication 10 MILLIGRAM(S): at 06:25

## 2019-04-21 RX ADMIN — CARVEDILOL PHOSPHATE 25 MILLIGRAM(S): 80 CAPSULE, EXTENDED RELEASE ORAL at 06:25

## 2019-04-21 RX ADMIN — DORZOLAMIDE HYDROCHLORIDE 1 DROP(S): 20 SOLUTION/ DROPS OPHTHALMIC at 21:13

## 2019-04-21 RX ADMIN — MONTELUKAST 10 MILLIGRAM(S): 4 TABLET, CHEWABLE ORAL at 12:54

## 2019-04-21 RX ADMIN — ATORVASTATIN CALCIUM 10 MILLIGRAM(S): 80 TABLET, FILM COATED ORAL at 22:26

## 2019-04-21 RX ADMIN — ENOXAPARIN SODIUM 40 MILLIGRAM(S): 100 INJECTION SUBCUTANEOUS at 14:09

## 2019-04-21 RX ADMIN — DORZOLAMIDE HYDROCHLORIDE 1 DROP(S): 20 SOLUTION/ DROPS OPHTHALMIC at 06:25

## 2019-04-21 RX ADMIN — BRIMONIDINE TARTRATE 1 DROP(S): 2 SOLUTION/ DROPS OPHTHALMIC at 06:25

## 2019-04-21 RX ADMIN — CARVEDILOL PHOSPHATE 25 MILLIGRAM(S): 80 CAPSULE, EXTENDED RELEASE ORAL at 17:55

## 2019-04-21 RX ADMIN — Medication 1000 UNIT(S): at 12:54

## 2019-04-21 RX ADMIN — DORZOLAMIDE HYDROCHLORIDE 1 DROP(S): 20 SOLUTION/ DROPS OPHTHALMIC at 14:10

## 2019-04-21 RX ADMIN — BUDESONIDE AND FORMOTEROL FUMARATE DIHYDRATE 2 PUFF(S): 160; 4.5 AEROSOL RESPIRATORY (INHALATION) at 21:13

## 2019-04-21 RX ADMIN — LATANOPROST 1 DROP(S): 0.05 SOLUTION/ DROPS OPHTHALMIC; TOPICAL at 21:12

## 2019-04-21 RX ADMIN — Medication 1 TABLET(S): at 12:54

## 2019-04-21 RX ADMIN — BUDESONIDE AND FORMOTEROL FUMARATE DIHYDRATE 2 PUFF(S): 160; 4.5 AEROSOL RESPIRATORY (INHALATION) at 12:55

## 2019-04-21 RX ADMIN — LOSARTAN POTASSIUM 100 MILLIGRAM(S): 100 TABLET, FILM COATED ORAL at 06:25

## 2019-04-21 RX ADMIN — LATANOPROST 1 DROP(S): 0.05 SOLUTION/ DROPS OPHTHALMIC; TOPICAL at 00:00

## 2019-04-21 RX ADMIN — Medication 81 MILLIGRAM(S): at 12:54

## 2019-04-21 RX ADMIN — BRIMONIDINE TARTRATE 1 DROP(S): 2 SOLUTION/ DROPS OPHTHALMIC at 21:12

## 2019-04-21 RX ADMIN — TAMSULOSIN HYDROCHLORIDE 0.4 MILLIGRAM(S): 0.4 CAPSULE ORAL at 22:26

## 2019-04-21 NOTE — PROGRESS NOTE ADULT - SUBJECTIVE AND OBJECTIVE BOX
ORTHO PROGRESS NOTE    Patient seen and examined this AM. S/P REDUCTION R NICO DISLOCATION. Doing well. No new onset of pain, fall or dislocation. Denies fevers or chills, chest or calf pain.    PE :  RLE :   Knee Immobilizer but refusing abduction Pillow  Thigh and calf soft and compressible  EHL TA GS motor intact  SILT distally  Foot warm and perfused    A/P  83M with right periprosthetic hip dislocation s/p closed reduction:  - Can be discharged with outpatient follow-up with primary surgeon or Dr. Shira Patrick, Call  to make appointment in 1-2 weeks. Information given to the patient.  - Pain control  - DVT PPX  - IS  - WBAT RLE  - Maintain Knee Immobilizer while ambulating and abduction pillow in bed (patient refusing)  - Posterior hip precautions emphasized with patient  - PT OT OOBTC  - Home meds  - Medical management per primary team  - Dispo: pending

## 2019-04-21 NOTE — PROGRESS NOTE ADULT - SUBJECTIVE AND OBJECTIVE BOX
SUBJECTIVE:    Patient is a 83y old Male who presents with a chief complaint of right hip prosthesis dislocation (21 Apr 2019 07:25)    Currently admitted to medicine with the primary diagnosis of Dislocation of right hip, initial encounter     Today is hospital day 2d.     PAST MEDICAL & SURGICAL HISTORY  CAD (coronary artery disease): pci x2 ~2008  Glaucoma  COPD (chronic obstructive pulmonary disease)  Prostate disorder  Kidney stones  Asthma  History of appendectomy  History of right hip replacement  History of implantable cardioverter-defibrillator (ICD) placement: VCV    ALLERGIES:  No Known Allergies    MEDICATIONS:  STANDING MEDICATIONS  aspirin  chewable 81 milliGRAM(s) Oral daily  atorvastatin 10 milliGRAM(s) Oral at bedtime  brimonidine 0.2% Ophthalmic Solution 1 Drop(s) Right EYE three times a day  buDESOnide 160 MICROgram(s)/formoterol 4.5 MICROgram(s) Inhaler 2 Puff(s) Inhalation two times a day  calcium carbonate   1250 mG (OsCal) 1 Tablet(s) Oral daily  carvedilol 25 milliGRAM(s) Oral every 12 hours  cholecalciferol 1000 Unit(s) Oral daily  dorzolamide 2% Ophthalmic Solution 1 Drop(s) Right EYE three times a day  enoxaparin Injectable 40 milliGRAM(s) SubCutaneous daily  latanoprost 0.005% Ophthalmic Solution 1 Drop(s) Right EYE at bedtime  losartan 100 milliGRAM(s) Oral daily  montelukast 10 milliGRAM(s) Oral daily  predniSONE   Tablet 10 milliGRAM(s) Oral daily  tamsulosin 0.4 milliGRAM(s) Oral at bedtime    PRN MEDICATIONS    VITALS:   T(F): 96.1  HR: 68  BP: 141/64  RR: 18  SpO2: --    LABS:                        13.0   10.41 )-----------( 201      ( 21 Apr 2019 07:22 )             38.8     04-21    143  |  108  |  22<H>  ----------------------------<  113<H>  4.3   |  26  |  1.0    Ca    8.5      21 Apr 2019 07:22    TPro  6.2  /  Alb  3.6  /  TBili  0.5  /  DBili  x   /  AST  18  /  ALT  16  /  AlkPhos  90  04-21                  RADIOLOGY:    PHYSICAL EXAM:  GEN: No acute distress  LUNGS: Clear to auscultation bilaterally   HEART: S1/S2 present. RRR.   ABD/ GI: Soft, non-tender, non-distended. Bowel sounds present  EXT: rt leg is in immobilizer   NEURO: AAOX3

## 2019-04-22 ENCOUNTER — TRANSCRIPTION ENCOUNTER (OUTPATIENT)
Age: 84
End: 2019-04-22

## 2019-04-22 VITALS
SYSTOLIC BLOOD PRESSURE: 109 MMHG | DIASTOLIC BLOOD PRESSURE: 55 MMHG | HEART RATE: 74 BPM | RESPIRATION RATE: 18 BRPM | TEMPERATURE: 97 F

## 2019-04-22 LAB
ALBUMIN SERPL ELPH-MCNC: 3.2 G/DL — LOW (ref 3.5–5.2)
ALP SERPL-CCNC: 83 U/L — SIGNIFICANT CHANGE UP (ref 30–115)
ALT FLD-CCNC: 15 U/L — SIGNIFICANT CHANGE UP (ref 0–41)
ANION GAP SERPL CALC-SCNC: 10 MMOL/L — SIGNIFICANT CHANGE UP (ref 7–14)
AST SERPL-CCNC: 17 U/L — SIGNIFICANT CHANGE UP (ref 0–41)
BASOPHILS # BLD AUTO: 0.04 K/UL — SIGNIFICANT CHANGE UP (ref 0–0.2)
BASOPHILS NFR BLD AUTO: 0.4 % — SIGNIFICANT CHANGE UP (ref 0–1)
BILIRUB SERPL-MCNC: 0.3 MG/DL — SIGNIFICANT CHANGE UP (ref 0.2–1.2)
BUN SERPL-MCNC: 25 MG/DL — HIGH (ref 10–20)
CALCIUM SERPL-MCNC: 8.4 MG/DL — LOW (ref 8.5–10.1)
CHLORIDE SERPL-SCNC: 106 MMOL/L — SIGNIFICANT CHANGE UP (ref 98–110)
CO2 SERPL-SCNC: 26 MMOL/L — SIGNIFICANT CHANGE UP (ref 17–32)
CREAT SERPL-MCNC: 1.1 MG/DL — SIGNIFICANT CHANGE UP (ref 0.7–1.5)
EOSINOPHIL # BLD AUTO: 0.08 K/UL — SIGNIFICANT CHANGE UP (ref 0–0.7)
EOSINOPHIL NFR BLD AUTO: 0.9 % — SIGNIFICANT CHANGE UP (ref 0–8)
GLUCOSE SERPL-MCNC: 97 MG/DL — SIGNIFICANT CHANGE UP (ref 70–99)
HCT VFR BLD CALC: 40.4 % — LOW (ref 42–52)
HGB BLD-MCNC: 12.9 G/DL — LOW (ref 14–18)
IMM GRANULOCYTES NFR BLD AUTO: 0.2 % — SIGNIFICANT CHANGE UP (ref 0.1–0.3)
LYMPHOCYTES # BLD AUTO: 2.97 K/UL — SIGNIFICANT CHANGE UP (ref 1.2–3.4)
LYMPHOCYTES # BLD AUTO: 31.8 % — SIGNIFICANT CHANGE UP (ref 20.5–51.1)
MCHC RBC-ENTMCNC: 29.9 PG — SIGNIFICANT CHANGE UP (ref 27–31)
MCHC RBC-ENTMCNC: 31.9 G/DL — LOW (ref 32–37)
MCV RBC AUTO: 93.7 FL — SIGNIFICANT CHANGE UP (ref 80–94)
MONOCYTES # BLD AUTO: 1.06 K/UL — HIGH (ref 0.1–0.6)
MONOCYTES NFR BLD AUTO: 11.3 % — HIGH (ref 1.7–9.3)
NEUTROPHILS # BLD AUTO: 5.17 K/UL — SIGNIFICANT CHANGE UP (ref 1.4–6.5)
NEUTROPHILS NFR BLD AUTO: 55.4 % — SIGNIFICANT CHANGE UP (ref 42.2–75.2)
NRBC # BLD: 0 /100 WBCS — SIGNIFICANT CHANGE UP (ref 0–0)
PLATELET # BLD AUTO: 204 K/UL — SIGNIFICANT CHANGE UP (ref 130–400)
POTASSIUM SERPL-MCNC: 4.4 MMOL/L — SIGNIFICANT CHANGE UP (ref 3.5–5)
POTASSIUM SERPL-SCNC: 4.4 MMOL/L — SIGNIFICANT CHANGE UP (ref 3.5–5)
PROT SERPL-MCNC: 5.8 G/DL — LOW (ref 6–8)
RBC # BLD: 4.31 M/UL — LOW (ref 4.7–6.1)
RBC # FLD: 13.4 % — SIGNIFICANT CHANGE UP (ref 11.5–14.5)
SODIUM SERPL-SCNC: 142 MMOL/L — SIGNIFICANT CHANGE UP (ref 135–146)
WBC # BLD: 9.34 K/UL — SIGNIFICANT CHANGE UP (ref 4.8–10.8)
WBC # FLD AUTO: 9.34 K/UL — SIGNIFICANT CHANGE UP (ref 4.8–10.8)

## 2019-04-22 RX ORDER — CARVEDILOL PHOSPHATE 80 MG/1
25 CAPSULE, EXTENDED RELEASE ORAL
Qty: 0 | Refills: 0 | COMMUNITY

## 2019-04-22 RX ORDER — ASPIRIN/CALCIUM CARB/MAGNESIUM 324 MG
1 TABLET ORAL
Qty: 30 | Refills: 0 | OUTPATIENT
Start: 2019-04-22 | End: 2019-05-21

## 2019-04-22 RX ADMIN — ENOXAPARIN SODIUM 40 MILLIGRAM(S): 100 INJECTION SUBCUTANEOUS at 11:51

## 2019-04-22 RX ADMIN — BUDESONIDE AND FORMOTEROL FUMARATE DIHYDRATE 2 PUFF(S): 160; 4.5 AEROSOL RESPIRATORY (INHALATION) at 11:12

## 2019-04-22 RX ADMIN — LOSARTAN POTASSIUM 100 MILLIGRAM(S): 100 TABLET, FILM COATED ORAL at 06:33

## 2019-04-22 RX ADMIN — Medication 1 TABLET(S): at 11:50

## 2019-04-22 RX ADMIN — DORZOLAMIDE HYDROCHLORIDE 1 DROP(S): 20 SOLUTION/ DROPS OPHTHALMIC at 13:58

## 2019-04-22 RX ADMIN — MONTELUKAST 10 MILLIGRAM(S): 4 TABLET, CHEWABLE ORAL at 11:50

## 2019-04-22 RX ADMIN — BRIMONIDINE TARTRATE 1 DROP(S): 2 SOLUTION/ DROPS OPHTHALMIC at 13:58

## 2019-04-22 RX ADMIN — CARVEDILOL PHOSPHATE 25 MILLIGRAM(S): 80 CAPSULE, EXTENDED RELEASE ORAL at 06:33

## 2019-04-22 RX ADMIN — Medication 81 MILLIGRAM(S): at 11:50

## 2019-04-22 RX ADMIN — Medication 1000 UNIT(S): at 11:52

## 2019-04-22 RX ADMIN — DORZOLAMIDE HYDROCHLORIDE 1 DROP(S): 20 SOLUTION/ DROPS OPHTHALMIC at 06:33

## 2019-04-22 RX ADMIN — BRIMONIDINE TARTRATE 1 DROP(S): 2 SOLUTION/ DROPS OPHTHALMIC at 06:33

## 2019-04-22 NOTE — PROGRESS NOTE ADULT - ASSESSMENT
Pt is an 84 y/o male with hx of COPD, CHF s/p AICD, HTN, DLD, presents to ED for right prosthetic hip dislocation. Right hip pain is moderate, constant, worse with movement. PTA pt bent over at home and felt hip come out. Pt did not fall, no other injury. Pt with one prior dislocation 2 years ago. Prosthesis was placed > 10 years ago.    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    #Chr systolic CHF s/p AICD- continue carvedilol and losartan.     #hx of CAD-- s/p PCI 10yrs ago-- stable    # HTN controlled, previous in a pain.- continue home meds    Pt medically cleared for DC. Med recc dw resident.

## 2019-04-22 NOTE — DISCHARGE NOTE PROVIDER - NSDCACTIVITY_GEN_ALL_CORE
Ambulate as tolerated with walker, bear weight on right leg as tolerated. Use knee immobilizer on ambulation.

## 2019-04-22 NOTE — PHYSICAL THERAPY INITIAL EVALUATION ADULT - PERTINENT HX OF CURRENT PROBLEM, REHAB EVAL
Pt states that he bent down at home to pick something up and heard a crack in the hip and felt pain.

## 2019-04-22 NOTE — PROGRESS NOTE ADULT - SUBJECTIVE AND OBJECTIVE BOX
Pt seen and examined at bedside. No CP or SOB. Pain is tolerable in left LE.     PAST MEDICAL & SURGICAL HISTORY:  CAD (coronary artery disease): pci x2 ~2008  Glaucoma  COPD (chronic obstructive pulmonary disease)  Prostate disorder  Kidney stones  Asthma  History of appendectomy  History of right hip replacement  History of implantable cardioverter-defibrillator (ICD) placement: [employer]      VITAL SIGNS (Last 24 hrs):  T(C): 36.3 (19 @ 13:35), Max: 36.6 (19 @ 05:20)  HR: 74 (19 @ 13:35) (64 - 80)  BP: 109/55 (19 @ 13:35) (109/55 - 155/72)  RR: 18 (19 @ 13:35) (18 - 18)  SpO2: --  Wt(kg): --  Daily     Daily Weight in k.2 (2019 05:20)    I&O's Summary    2019 07:  -  2019 07:00  --------------------------------------------------------  IN: 540 mL / OUT: 1176 mL / NET: -636 mL    2019 07:01  -  2019 14:58  --------------------------------------------------------  IN: 420 mL / OUT: 200 mL / NET: 220 mL        PHYSICAL EXAM:  GENERAL: NAD, well-developed  HEAD:  Atraumatic, Normocephalic  CHEST/LUNG: Clear to auscultation bilaterally; No wheeze  HEART: Regular rate and rhythm; No murmurs, rubs, or gallops  ABDOMEN: Soft, Nontender, Nondistended; Bowel sounds present  EXTREMITIES:  2+ Peripheral Pulses, No clubbing, cyanosis, or edema, left lower ext wrapped.   PSYCH: AAOx3  NEUROLOGY: non-focal      Labs Reviewed  Spoke to patient in regards to abnormal labs.    CBC Full  -  ( 2019 05:49 )  WBC Count : 9.34 K/uL  Hemoglobin : 12.9 g/dL  Hematocrit : 40.4 %  Platelet Count - Automated : 204 K/uL  Mean Cell Volume : 93.7 fL  Mean Cell Hemoglobin : 29.9 pg  Mean Cell Hemoglobin Concentration : 31.9 g/dL  Auto Neutrophil # : 5.17 K/uL  Auto Lymphocyte # : 2.97 K/uL  Auto Monocyte # : 1.06 K/uL  Auto Eosinophil # : 0.08 K/uL  Auto Basophil # : 0.04 K/uL  Auto Neutrophil % : 55.4 %  Auto Lymphocyte % : 31.8 %  Auto Monocyte % : 11.3 %  Auto Eosinophil % : 0.9 %  Auto Basophil % : 0.4 %    BMP:     @ 05:49    Blood Urea Nitrogen - 25  Calcium - 8.4  Carbond Dioxide - 26  Chloride - 106  Creatinine - 1.1  Glucose - 97  Potassium - 4.4  Sodium - 142      Hemoglobin A1c -   PT/INR - ( 2019 11:16 )   PT: 10.50 sec;   INR: 0.91 ratio         PTT - ( 2019 11:16 )  PTT:29.6 sec  Urine Culture:        Imaging reviewed      MEDICATIONS  (STANDING):  aspirin  chewable 81 milliGRAM(s) Oral daily  atorvastatin 10 milliGRAM(s) Oral at bedtime  brimonidine 0.2% Ophthalmic Solution 1 Drop(s) Right EYE three times a day  buDESOnide 160 MICROgram(s)/formoterol 4.5 MICROgram(s) Inhaler 2 Puff(s) Inhalation two times a day  calcium carbonate   1250 mG (OsCal) 1 Tablet(s) Oral daily  carvedilol 25 milliGRAM(s) Oral every 12 hours  cholecalciferol 1000 Unit(s) Oral daily  dorzolamide 2% Ophthalmic Solution 1 Drop(s) Right EYE three times a day  enoxaparin Injectable 40 milliGRAM(s) SubCutaneous daily  latanoprost 0.005% Ophthalmic Solution 1 Drop(s) Right EYE at bedtime  losartan 100 milliGRAM(s) Oral daily  montelukast 10 milliGRAM(s) Oral daily  predniSONE   Tablet 10 milliGRAM(s) Oral daily  tamsulosin 0.4 milliGRAM(s) Oral at bedtime    MEDICATIONS  (PRN):

## 2019-04-22 NOTE — DISCHARGE NOTE NURSING/CASE MANAGEMENT/SOCIAL WORK - NSDCDPATPORTLINK_GEN_ALL_CORE
You can access the [facility] Patient Portal, offered by [facility], by registering with the following website: [email or web address]

## 2019-04-22 NOTE — PROGRESS NOTE ADULT - REASON FOR ADMISSION
right hip prosthesis dislocation

## 2019-04-22 NOTE — DISCHARGE NOTE PROVIDER - NSDCCPCAREPLAN_GEN_ALL_CORE_FT
PRINCIPAL DISCHARGE DIAGNOSIS  Diagnosis: Dislocation of right hip, initial encounter  Assessment and Plan of Treatment: You had closed reduction of your hip dislocation. Follow up as outpatient with Dr Patrick- call [phone] to make appt in 1-2 weeks.  Use knee immobilizer on ambulation. Use abduction pillow in bed if comfortable. Bear weight on right leg as tolerated.      SECONDARY DISCHARGE DIAGNOSES  Diagnosis: Coronary artery disease  Assessment and Plan of Treatment: You were started on aspirin. Continue to take aspirin and statin, carvedilol and losartan as precribed. Follow up with your primary care/ cardiologist as outpt for future care.

## 2019-04-26 DIAGNOSIS — J45.909 UNSPECIFIED ASTHMA, UNCOMPLICATED: ICD-10-CM

## 2019-04-26 DIAGNOSIS — E78.5 HYPERLIPIDEMIA, UNSPECIFIED: ICD-10-CM
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
## 2019-04-26 DIAGNOSIS — L10.9 PEMPHIGUS, UNSPECIFIED: ICD-10-CM

## 2019-04-26 DIAGNOSIS — I50.22 CHRONIC SYSTOLIC (CONGESTIVE) HEART FAILURE: ICD-10-CM

## 2019-04-26 DIAGNOSIS — I25.10 ATHEROSCLEROTIC HEART DISEASE OF NATIVE CORONARY ARTERY WITHOUT ANGINA PECTORIS: ICD-10-CM

## 2019-04-26 DIAGNOSIS — N42.9 DISORDER OF PROSTATE, UNSPECIFIED: ICD-10-CM

## 2019-04-26 DIAGNOSIS — T84.020A DISLOCATION OF INTERNAL RIGHT HIP PROSTHESIS, INITIAL ENCOUNTER: ICD-10-CM

## 2019-04-26 DIAGNOSIS — Z95.5 PRESENCE OF CORONARY ANGIOPLASTY IMPLANT AND GRAFT: ICD-10-CM

## 2019-04-26 DIAGNOSIS — I11.0 HYPERTENSIVE HEART DISEASE WITH HEART FAILURE: ICD-10-CM

## 2019-04-26 DIAGNOSIS — Z96.641 PRESENCE OF RIGHT ARTIFICIAL HIP JOINT: ICD-10-CM

## 2019-04-26 DIAGNOSIS — Z87.891 PERSONAL HISTORY OF NICOTINE DEPENDENCE: ICD-10-CM

## 2019-04-26 DIAGNOSIS — E87.5 HYPERKALEMIA: ICD-10-CM

## 2019-04-26 DIAGNOSIS — Z95.810 PRESENCE OF AUTOMATIC (IMPLANTABLE) CARDIAC DEFIBRILLATOR: ICD-10-CM
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
## 2019-04-26 DIAGNOSIS — M25.551 PAIN IN RIGHT HIP: ICD-10-CM
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
## 2019-04-26 DIAGNOSIS — H40.9 UNSPECIFIED GLAUCOMA: ICD-10-CM

## 2019-04-26 DIAGNOSIS — J44.9 CHRONIC OBSTRUCTIVE PULMONARY DISEASE, UNSPECIFIED: ICD-10-CM

## 2019-05-03 PROBLEM — I25.10 ATHEROSCLEROTIC HEART DISEASE OF NATIVE CORONARY ARTERY WITHOUT ANGINA PECTORIS: Chronic | Status: ACTIVE | Noted: 2019-04-19

## 2019-06-06 ENCOUNTER — OUTPATIENT (OUTPATIENT)
Dept: OUTPATIENT SERVICES | Facility: HOSPITAL | Age: 84
LOS: 1 days | Discharge: HOME | End: 2019-06-06

## 2019-06-06 DIAGNOSIS — R26.9 UNSPECIFIED ABNORMALITIES OF GAIT AND MOBILITY: ICD-10-CM

## 2019-06-06 DIAGNOSIS — Z95.810 PRESENCE OF AUTOMATIC (IMPLANTABLE) CARDIAC DEFIBRILLATOR: Chronic | ICD-10-CM

## 2019-06-06 DIAGNOSIS — Z90.49 ACQUIRED ABSENCE OF OTHER SPECIFIED PARTS OF DIGESTIVE TRACT: Chronic | ICD-10-CM

## 2019-06-06 DIAGNOSIS — Z96.641 PRESENCE OF RIGHT ARTIFICIAL HIP JOINT: Chronic | ICD-10-CM

## 2021-10-18 PROBLEM — Z00.00 ENCOUNTER FOR PREVENTIVE HEALTH EXAMINATION: Status: ACTIVE | Noted: 2021-10-18

## 2021-10-21 ENCOUNTER — APPOINTMENT (OUTPATIENT)
Dept: NEUROLOGY | Facility: CLINIC | Age: 86
End: 2021-10-21

## 2023-08-28 NOTE — ED ADULT TRIAGE NOTE - NS ED TRIAGE AVPU SCALE
Hello,  This pt responded to campaign message recommending a lipid panel, can you ask Dr Kitchen what dx should be used when ordering lipid panel? Let  me know and I will get pt scheduled.  Thanks Mónica LEDESMA [facility] Care Coordination Department [facility]   Alert-The patient is alert, awake and responds to voice. The patient is oriented to time, place, and person. The triage nurse is able to obtain subjective information.

## 2023-09-11 NOTE — ED ADULT NURSE NOTE - CCCP TRG CHIEF CMPLNT
shortness of breath Plan: Pt is to use hydrocortisone 2x a day for 3-5 days then stop. Follow up with aquaphor and a 2 month follow up on AK above lip. Detail Level: Zone

## 2023-11-27 NOTE — DISCHARGE NOTE PROVIDER - HOSPITAL COURSE
Addended by: DENNIS HALL on: 11/27/2023 11:09 AM     Modules accepted: Orders    
83/M hx from home, of CAD s/p stent x2 (~2009), heart failure s/p AICD (no EF available), glaucoma to right eye, right hip prosthesis (2001), recent [facility] admission 10/2018 (resp infection / COPD), presents to ED for right hip pain.        Pt states he was at home, feeling well, he went to lift dishes from under the cabinet and suddenly had right hip pain. Family called EMS. While in ED reduction was attempted and failed x2.         He denies chest pain, sob, lower ext swelling, fever, cough. Pt was seen by ortho - failed closed reduction under propofol. Then underwent closed reduction under general anesthesia. Pt followed by ortho- cleared for discharge home with outpt follow up. Pt is ambulating with walker and has a knee immobilizer in place. Pt is being discharged home with advise to follow up with his cardiologist and orthopedic doctor.

## 2024-03-21 NOTE — PATIENT PROFILE ADULT - PRO INTERPRETER NEED 2
English Patient with abdominal pain, vomiting, diarrhea.  Recent travel to [place].  Will check labs, give pain medicine and IV fluids, reassess

## 2024-05-08 NOTE — PATIENT PROFILE ADULT - BRADEN SENSORY
Detail Level: Detailed
Quality 226: Preventive Care And Screening: Tobacco Use: Screening And Cessation Intervention: Patient screened for tobacco use and is an ex/non-smoker
(3) slightly limited

## 2024-05-12 NOTE — DISCHARGE NOTE NURSING/CASE MANAGEMENT/SOCIAL WORK - NSPROEXTENSIONSOFSELF_GEN_A_NUR
none
bilateral upper extremity Active ROM was WFL (within functional limits)/bilateral  lower extremity Active ROM was WFL (within functional limits)